# Patient Record
Sex: FEMALE | Race: WHITE | Employment: OTHER | URBAN - METROPOLITAN AREA
[De-identification: names, ages, dates, MRNs, and addresses within clinical notes are randomized per-mention and may not be internally consistent; named-entity substitution may affect disease eponyms.]

---

## 2017-12-13 ENCOUNTER — HOSPITAL ENCOUNTER (OUTPATIENT)
Dept: WOMENS IMAGING | Age: 62
Discharge: HOME | End: 2017-12-13
Attending: INTERNAL MEDICINE | Admitting: INTERNAL MEDICINE

## 2017-12-13 DIAGNOSIS — Z12.31 VISIT FOR SCREENING MAMMOGRAM: ICD-10-CM

## 2018-03-14 PROBLEM — F34.1 DYSTHYMIC DISORDER: Status: ACTIVE | Noted: 2018-03-14

## 2018-03-14 PROBLEM — E78.00 HIGH CHOLESTEROL: Status: ACTIVE | Noted: 2018-03-14

## 2018-03-14 PROBLEM — Z12.11 SCREEN FOR COLON CANCER: Status: ACTIVE | Noted: 2018-03-14

## 2018-03-14 PROBLEM — R56.9 SEIZURE (HCC): Status: ACTIVE | Noted: 2018-03-14

## 2018-04-13 PROBLEM — Z12.11 SCREEN FOR COLON CANCER: Status: RESOLVED | Noted: 2018-03-14 | Resolved: 2018-04-13

## 2018-06-14 PROBLEM — G47.00 INSOMNIA: Status: ACTIVE | Noted: 2018-06-14

## 2018-06-14 PROBLEM — I10 HYPERTENSION: Status: ACTIVE | Noted: 2018-06-14

## 2018-07-31 PROBLEM — S72.001A CLOSED RIGHT HIP FRACTURE, INITIAL ENCOUNTER (HCC): Status: ACTIVE | Noted: 2018-07-31

## 2018-08-02 PROBLEM — S72.001S CLOSED RIGHT HIP FRACTURE, SEQUELA: Status: ACTIVE | Noted: 2018-08-02

## 2018-08-08 ENCOUNTER — CARE COORDINATION (OUTPATIENT)
Dept: CASE MANAGEMENT | Age: 63
End: 2018-08-08

## 2018-08-10 ENCOUNTER — CARE COORDINATION (OUTPATIENT)
Dept: CASE MANAGEMENT | Age: 63
End: 2018-08-10

## 2018-08-13 ENCOUNTER — CARE COORDINATION (OUTPATIENT)
Dept: CASE MANAGEMENT | Age: 63
End: 2018-08-13

## 2018-08-17 ENCOUNTER — CARE COORDINATION (OUTPATIENT)
Dept: CASE MANAGEMENT | Age: 63
End: 2018-08-17

## 2018-08-17 DIAGNOSIS — S72.001S CLOSED RIGHT HIP FRACTURE, SEQUELA: Primary | ICD-10-CM

## 2018-08-17 PROCEDURE — 1111F DSCHRG MED/CURRENT MED MERGE: CPT

## 2018-08-17 NOTE — CARE COORDINATION
Spoke with Philip Gomez - sister & guardian of patient. Brian Emmanuel (patient)    Incision status: dry & intact - steri strips in place    Edema/Swelling - no edema noted at surgical site    Pain level and status: 5/10    Use of pain medications: yes - [facility] feels the pain meds are effective for her sister. Bowels: moving without difficulty    Home Care Agency active: Patient has 24 hr caregivers at home. HHC from [facility] will be having start of care today. Outpatient therapy: N/A    Do you have all of your medications: yes    Changes in medications: yes    [facility] states they received a copy of Seble's d/c instructions. [facility] does not remember if they were reviewed with them - she does not think so. [facility] reviewed her sister's daily med list with writer ([id number] completed). [facility] will put the GARRY hose on Seble. Discussed need for follow up appts. [facility] states the caregiver who transports Maritza Hernandez will need to schedule the follow up appts. Discussed [facility]JR program. Confirmed mailing address - letter sent.     Follow up appointments:    Future Appointments  Date Time Provider Bc Wesley   9/12/2018 11:00 AM Shweta Coventry, MD [facility]

## 2018-08-27 ENCOUNTER — CARE COORDINATION (OUTPATIENT)
Dept: CASE MANAGEMENT | Age: 63
End: 2018-08-27

## 2018-08-27 NOTE — CARE COORDINATION
Attempted to reach pt for CCJR ortho bundle follow up call. Left message requesting call back.     Holland Duque [facility]   [phone]

## 2018-09-17 ENCOUNTER — CARE COORDINATION (OUTPATIENT)
Dept: CASE MANAGEMENT | Age: 63
End: 2018-09-17

## 2018-10-30 ENCOUNTER — CARE COORDINATION (OUTPATIENT)
Dept: CASE MANAGEMENT | Age: 63
End: 2018-10-30

## 2018-12-12 ENCOUNTER — HOSPITAL ENCOUNTER (OUTPATIENT)
Age: 63
Discharge: HOME | End: 2018-12-12
Payer: MEDICARE

## 2018-12-12 ENCOUNTER — HOSPITAL ENCOUNTER (OUTPATIENT)
Dept: GENERAL RADIOLOGY | Age: 63
Discharge: HOME | End: 2018-12-12
Payer: MEDICARE

## 2018-12-12 DIAGNOSIS — M25.551 PAIN OF RIGHT HIP JOINT: ICD-10-CM

## 2018-12-12 PROCEDURE — 73502 X-RAY EXAM HIP UNI 2-3 VIEWS: CPT

## 2019-01-14 ENCOUNTER — HOSPITAL ENCOUNTER (OUTPATIENT)
Dept: WOMENS IMAGING | Age: 64
Discharge: HOME | End: 2019-01-14
Payer: MEDICARE

## 2019-01-14 DIAGNOSIS — Z12.31 VISIT FOR SCREENING MAMMOGRAM: ICD-10-CM

## 2019-01-14 PROCEDURE — 77067 SCR MAMMO BI INCL CAD: CPT

## 2019-07-11 ENCOUNTER — OFFICE VISIT (OUTPATIENT)
Dept: ORTHOPEDIC SURGERY | Age: 64
End: 2019-07-11
Payer: MEDICARE

## 2019-07-11 VITALS
BODY MASS INDEX: 22.66 KG/M2 | SYSTOLIC BLOOD PRESSURE: 137 MMHG | HEART RATE: 72 BPM | WEIGHT: 120 LBS | DIASTOLIC BLOOD PRESSURE: 82 MMHG | HEIGHT: 61 IN

## 2019-07-11 DIAGNOSIS — Z96.641 STATUS POST TOTAL REPLACEMENT OF RIGHT HIP: Primary | ICD-10-CM

## 2019-07-11 DIAGNOSIS — M25.451 SWELLING OF JOINT OF PELVIC REGION OR THIGH, RIGHT: ICD-10-CM

## 2019-07-11 PROCEDURE — G8427 DOCREV CUR MEDS BY ELIG CLIN: HCPCS | Performed by: ORTHOPAEDIC SURGERY

## 2019-07-11 PROCEDURE — G8420 CALC BMI NORM PARAMETERS: HCPCS | Performed by: ORTHOPAEDIC SURGERY

## 2019-07-11 PROCEDURE — 3017F COLORECTAL CA SCREEN DOC REV: CPT | Performed by: ORTHOPAEDIC SURGERY

## 2019-07-11 PROCEDURE — 99213 OFFICE O/P EST LOW 20 MIN: CPT | Performed by: ORTHOPAEDIC SURGERY

## 2019-07-11 PROCEDURE — 1036F TOBACCO NON-USER: CPT | Performed by: ORTHOPAEDIC SURGERY

## 2019-07-11 NOTE — PROGRESS NOTES
replacement of right hip  XR HIP 2-3 VW W PELVIS RIGHT   2. Swelling of joint of pelvic region or thigh, right  CT FEMUR RIGHT WO CONTRAST       No orders of the defined types were placed in this encounter.     Differential:  Coxa saltans, scar tissue, iliopsoas irritation/rubbing, instability  Felt to be less likely instability  Reassurance given    No obvious clinical signs of infection    Will obtain CT of right thigh to evaluate her asymmetric thigh swelling, and will call her with results    Zoila Lopez

## 2019-07-18 ENCOUNTER — HOSPITAL ENCOUNTER (EMERGENCY)
Age: 64
Discharge: HOME | End: 2019-07-18
Attending: EMERGENCY MEDICINE
Payer: MEDICARE

## 2019-07-18 VITALS
TEMPERATURE: 97.8 F | BODY MASS INDEX: 22.98 KG/M2 | RESPIRATION RATE: 19 BRPM | OXYGEN SATURATION: 98 % | HEIGHT: 61 IN | SYSTOLIC BLOOD PRESSURE: 144 MMHG | DIASTOLIC BLOOD PRESSURE: 76 MMHG | HEART RATE: 65 BPM | WEIGHT: 121.69 LBS

## 2019-07-18 DIAGNOSIS — T50.905A MEDICATION REACTION, INITIAL ENCOUNTER: ICD-10-CM

## 2019-07-18 DIAGNOSIS — R42 DIZZINESS: Primary | ICD-10-CM

## 2019-07-18 PROCEDURE — 93005 ELECTROCARDIOGRAM TRACING: CPT | Performed by: EMERGENCY MEDICINE

## 2019-07-18 PROCEDURE — 99284 EMERGENCY DEPT VISIT MOD MDM: CPT

## 2019-07-18 NOTE — ED PROVIDER NOTES
occasionally she would feel dizzy and almost fall and complain of just losing her balance. Hence caregiver brought her in for evaluation. Patient is asymptomatic. SPECT is probably medication related. We will probably decrease her atenolol back to 50 and may be even increase it to twice a day instead of just once in the morning. I think that might be too much for her at this time but will leave it up to her family doctor. This is just a recommendation at this time. Further testing warranted at this time. REVAL:         CRITICAL CARE TIME   Total CriticalCare time was 0 minutes, excluding separately reportable procedures. There was a high probability of clinically significant/life threatening deterioration in the patient's condition which required my urgent intervention. CONSULTS:  None    PROCEDURES:  Unless otherwise noted below, none     [unfilled]    FINAL IMPRESSION      1. Dizziness    2. Medication reaction, initial encounter          DISPOSITION/PLAN   DISPOSITION Decision To Discharge 07/18/2019 01:40:40 PM      PATIENT REFERRED TO:  Maggi Panchal MD  [facility] [id number]  [phone]    Schedule an appointment as soon as possible for a visit in 1 week  If symptoms worsen      DISCHARGE MEDICATIONS:  New Prescriptions    No medications on file          (Please note:  Portions of this note were completed with a voice recognition program.Efforts were made to edit the dictations but occasionally words and phrases are mis-transcribed.)  Form v2016. [id number]    Aníbal RODGERS MD (electronically signed)  Emergency Medicine Provider        Talha Lopez MD  07/18/19 5415

## 2019-07-18 NOTE — ED TRIAGE NOTES
Pt presents the ED with caregiver who states the patient was tripping over her feet today, seemed dizzy. Has not experienced this before.

## 2019-07-19 LAB
EKG ATRIAL RATE: 65 BPM
EKG DIAGNOSIS: NORMAL
EKG P AXIS: 23 DEGREES
EKG P-R INTERVAL: 128 MS
EKG Q-T INTERVAL: 416 MS
EKG QRS DURATION: 92 MS
EKG QTC CALCULATION (BAZETT): 432 MS
EKG R AXIS: 24 DEGREES
EKG T AXIS: 42 DEGREES
EKG VENTRICULAR RATE: 65 BPM

## 2019-07-19 PROCEDURE — 93010 ELECTROCARDIOGRAM REPORT: CPT | Performed by: INTERNAL MEDICINE

## 2020-01-16 ENCOUNTER — HOSPITAL ENCOUNTER (OUTPATIENT)
Dept: WOMENS IMAGING | Age: 65
Discharge: HOME | End: 2020-01-16
Payer: MEDICARE

## 2020-01-16 PROCEDURE — 77067 SCR MAMMO BI INCL CAD: CPT

## 2021-01-11 DIAGNOSIS — E78.00 HIGH CHOLESTEROL: ICD-10-CM

## 2021-01-12 LAB
A/G RATIO: 1.3 (ref 1.1–2.2)
ALBUMIN SERPL-MCNC: 4 G/DL (ref 3.4–5)
ALP BLD-CCNC: 131 U/L (ref 40–129)
ALT SERPL-CCNC: 26 U/L (ref 10–40)
ANION GAP SERPL CALCULATED.3IONS-SCNC: 13 MMOL/L (ref 3–16)
AST SERPL-CCNC: 41 U/L (ref 15–37)
BASOPHILS ABSOLUTE: 0 K/UL (ref 0–0.2)
BASOPHILS RELATIVE PERCENT: 0.5 %
BILIRUB SERPL-MCNC: 0.3 MG/DL (ref 0–1)
BUN BLDV-MCNC: 18 MG/DL (ref 7–20)
CALCIUM SERPL-MCNC: 9.1 MG/DL (ref 8.3–10.6)
CHLORIDE BLD-SCNC: 104 MMOL/L (ref 99–110)
CHOLESTEROL, TOTAL: 150 MG/DL (ref 0–199)
CO2: 25 MMOL/L (ref 21–32)
CREAT SERPL-MCNC: 0.8 MG/DL (ref 0.6–1.2)
EOSINOPHILS ABSOLUTE: 0.1 K/UL (ref 0–0.6)
EOSINOPHILS RELATIVE PERCENT: 2 %
GFR AFRICAN AMERICAN: >60
GFR NON-AFRICAN AMERICAN: >60
GLOBULIN: 3 G/DL
GLUCOSE BLD-MCNC: 85 MG/DL (ref 70–99)
HCT VFR BLD CALC: 37.8 % (ref 36–48)
HDLC SERPL-MCNC: 52 MG/DL (ref 40–60)
HEMOGLOBIN: 12.8 G/DL (ref 12–16)
LDL CHOLESTEROL CALCULATED: 78 MG/DL
LYMPHOCYTES ABSOLUTE: 0.9 K/UL (ref 1–5.1)
LYMPHOCYTES RELATIVE PERCENT: 30.8 %
MCH RBC QN AUTO: 33.2 PG (ref 26–34)
MCHC RBC AUTO-ENTMCNC: 34 G/DL (ref 31–36)
MCV RBC AUTO: 97.8 FL (ref 80–100)
MONOCYTES ABSOLUTE: 0.4 K/UL (ref 0–1.3)
MONOCYTES RELATIVE PERCENT: 14.3 %
NEUTROPHILS ABSOLUTE: 1.6 K/UL (ref 1.7–7.7)
NEUTROPHILS RELATIVE PERCENT: 52.4 %
PDW BLD-RTO: 13.1 % (ref 12.4–15.4)
PLATELET # BLD: 100 K/UL (ref 135–450)
PMV BLD AUTO: 8.1 FL (ref 5–10.5)
POTASSIUM SERPL-SCNC: 4.1 MMOL/L (ref 3.5–5.1)
RBC # BLD: 3.87 M/UL (ref 4–5.2)
SODIUM BLD-SCNC: 142 MMOL/L (ref 136–145)
TOTAL PROTEIN: 7 G/DL (ref 6.4–8.2)
TRIGL SERPL-MCNC: 102 MG/DL (ref 0–150)
TSH SERPL DL<=0.05 MIU/L-ACNC: 1.25 UIU/ML (ref 0.27–4.2)
VLDLC SERPL CALC-MCNC: 20 MG/DL
WBC # BLD: 3 K/UL (ref 4–11)

## 2021-07-28 PROBLEM — D68.9 COAGULATION DEFECT (HCC): Status: ACTIVE | Noted: 2021-07-28

## 2022-05-18 ENCOUNTER — HOSPITAL ENCOUNTER (OUTPATIENT)
Dept: WOMENS IMAGING | Age: 67
Discharge: HOME | End: 2022-05-18
Payer: MEDICARE

## 2022-05-18 DIAGNOSIS — Z12.11 SCREEN FOR COLON CANCER: ICD-10-CM

## 2022-05-18 DIAGNOSIS — E78.00 HIGH CHOLESTEROL: ICD-10-CM

## 2022-05-18 DIAGNOSIS — Z12.31 OTHER SCREENING MAMMOGRAM: ICD-10-CM

## 2022-05-18 PROCEDURE — 77067 SCR MAMMO BI INCL CAD: CPT

## 2022-05-23 RX ORDER — DESIPRAMINE HYDROCHLORIDE 10 MG/1
10 TABLET ORAL NIGHTLY
COMMUNITY

## 2022-05-24 ENCOUNTER — ANESTHESIA EVENT (OUTPATIENT)
Dept: ENDOSCOPY | Age: 67
End: 2022-05-24
Payer: MEDICARE

## 2022-05-25 ENCOUNTER — HOSPITAL ENCOUNTER (OUTPATIENT)
Age: 67
Setting detail: OUTPATIENT SURGERY
Discharge: HOME | End: 2022-05-25
Attending: INTERNAL MEDICINE | Admitting: INTERNAL MEDICINE
Payer: MEDICARE

## 2022-05-25 ENCOUNTER — ANESTHESIA (OUTPATIENT)
Dept: ENDOSCOPY | Age: 67
End: 2022-05-25
Payer: MEDICARE

## 2022-05-25 VITALS
WEIGHT: 123 LBS | SYSTOLIC BLOOD PRESSURE: 142 MMHG | DIASTOLIC BLOOD PRESSURE: 92 MMHG | TEMPERATURE: 96.8 F | HEIGHT: 61 IN | HEART RATE: 79 BPM | OXYGEN SATURATION: 100 % | RESPIRATION RATE: 18 BRPM | BODY MASS INDEX: 23.22 KG/M2

## 2022-05-25 DIAGNOSIS — Z12.11 SCREEN FOR COLON CANCER: ICD-10-CM

## 2022-05-25 PROCEDURE — 7100000011 HC PHASE II RECOVERY - ADDTL 15 MIN: Performed by: INTERNAL MEDICINE

## 2022-05-25 PROCEDURE — 6360000002 HC RX W HCPCS

## 2022-05-25 PROCEDURE — 2500000003 HC RX 250 WO HCPCS

## 2022-05-25 PROCEDURE — 2709999900 HC NON-CHARGEABLE SUPPLY: Performed by: INTERNAL MEDICINE

## 2022-05-25 PROCEDURE — 3609010300 HC COLONOSCOPY W/BIOPSY SINGLE/MULTIPLE: Performed by: INTERNAL MEDICINE

## 2022-05-25 PROCEDURE — 3700000001 HC ADD 15 MINUTES (ANESTHESIA): Performed by: INTERNAL MEDICINE

## 2022-05-25 PROCEDURE — 3700000000 HC ANESTHESIA ATTENDED CARE: Performed by: INTERNAL MEDICINE

## 2022-05-25 PROCEDURE — 88305 TISSUE EXAM BY PATHOLOGIST: CPT

## 2022-05-25 PROCEDURE — 2580000003 HC RX 258: Performed by: ANESTHESIOLOGY

## 2022-05-25 PROCEDURE — 7100000010 HC PHASE II RECOVERY - FIRST 15 MIN: Performed by: INTERNAL MEDICINE

## 2022-05-25 RX ORDER — SODIUM CHLORIDE 0.9 % (FLUSH) 0.9 %
5-40 SYRINGE (ML) INJECTION PRN
Status: DISCONTINUED | OUTPATIENT
Start: 2022-05-25 | End: 2022-05-25 | Stop reason: HOSPADM

## 2022-05-25 RX ORDER — SODIUM CHLORIDE 0.9 % (FLUSH) 0.9 %
5-40 SYRINGE (ML) INJECTION EVERY 12 HOURS SCHEDULED
Status: DISCONTINUED | OUTPATIENT
Start: 2022-05-25 | End: 2022-05-25 | Stop reason: HOSPADM

## 2022-05-25 RX ORDER — SODIUM CHLORIDE 9 MG/ML
INJECTION, SOLUTION INTRAVENOUS PRN
Status: DISCONTINUED | OUTPATIENT
Start: 2022-05-25 | End: 2022-05-25 | Stop reason: HOSPADM

## 2022-05-25 RX ORDER — LIDOCAINE HYDROCHLORIDE 20 MG/ML
INJECTION, SOLUTION EPIDURAL; INFILTRATION; INTRACAUDAL; PERINEURAL PRN
Status: DISCONTINUED | OUTPATIENT
Start: 2022-05-25 | End: 2022-05-25 | Stop reason: SDUPTHER

## 2022-05-25 RX ORDER — PROPOFOL 10 MG/ML
INJECTION, EMULSION INTRAVENOUS PRN
Status: DISCONTINUED | OUTPATIENT
Start: 2022-05-25 | End: 2022-05-25 | Stop reason: SDUPTHER

## 2022-05-25 RX ADMIN — SODIUM CHLORIDE 5 ML/HR: 9 INJECTION, SOLUTION INTRAVENOUS at 10:35

## 2022-05-25 RX ADMIN — PROPOFOL 30 MG: 10 INJECTION, EMULSION INTRAVENOUS at 10:51

## 2022-05-25 RX ADMIN — LIDOCAINE HYDROCHLORIDE 100 MG: 20 INJECTION, SOLUTION EPIDURAL; INFILTRATION; INTRACAUDAL; PERINEURAL at 10:42

## 2022-05-25 RX ADMIN — PROPOFOL 70 MG: 10 INJECTION, EMULSION INTRAVENOUS at 10:42

## 2022-05-25 RX ADMIN — PROPOFOL 30 MG: 10 INJECTION, EMULSION INTRAVENOUS at 10:47

## 2022-05-25 ASSESSMENT — LIFESTYLE VARIABLES: SMOKING_STATUS: 0

## 2022-05-25 ASSESSMENT — PAIN - FUNCTIONAL ASSESSMENT: PAIN_FUNCTIONAL_ASSESSMENT: NONE - DENIES PAIN

## 2022-05-25 NOTE — ANESTHESIA POSTPROCEDURE EVALUATION
Department of Anesthesiology  Postprocedure Note    Patient: Lachelle Mcgowan  MRN: [id number]  YOB: 1955  Date of evaluation: 5/25/2022  Time:  11:54 AM     Procedure Summary     Date: 05/25/22 Room / Location: [address] [place] [address]    Anesthesia Start: 5450 Anesthesia Stop: 0854    Procedure: COLONOSCOPY WITH BIOPSY (N/A ) Diagnosis:       Screen for colon cancer      (Screen, incomplete colon in 2014)    Surgeons: Josiah Runao MD Responsible Provider: Valeria Sanchez MD    Anesthesia Type: MAC ASA Status: 2          Anesthesia Type: MAC    Carina Phase I: Carina Score: 10    Carina Phase II: Carina Score: 10    Last vitals: Reviewed and per EMR flowsheets. Anesthesia Post Evaluation    Patient location during evaluation: PACU  Patient participation: complete - patient participated  Level of consciousness: awake and alert  Airway patency: patent  Nausea & Vomiting: no nausea and no vomiting  Complications: no  Cardiovascular status: hemodynamically stable and blood pressure returned to baseline  Respiratory status: spontaneous ventilation, nonlabored ventilation and room air  Hydration status: stable  Comments: Ms. Hilary Gaming was seen resting comfortably following procedure. Appropriate for discharge home with .

## 2022-05-25 NOTE — ANESTHESIA PRE PROCEDURE
Department of Anesthesiology  Preprocedure Note       Name:  Oliverio Rivera   Age:  61 y.o.  :  1955                                          MRN:  [id number]         Date:  2022      Surgeon: Aruna Avery):  Shubham Stevens MD    Procedure: Procedure(s):  COLONOSCOPY DIAGNOSTIC    Medications prior to admission:   Prior to Admission medications    Medication Sig Start Date End Date Taking?  Authorizing Provider   desipramine (NOPRAMIN) 10 MG tablet Take 10 mg by mouth nightly   Yes Historical Provider, MD   amLODIPine (NORVASC) 5 MG tablet TAKE ONE TABLET BY MOUTH DAILY 3/21/22   Ross Cunha MD   ARIPiprazole (ABILIFY) 5 MG tablet Take 1 tablet by mouth daily 3/11/22   Ross Cunha MD   carBAMazepine (TEGRETOL) 200 MG tablet TAKE ONE TABLET BY MOUTH TWICE A DAY 22   Ross Cunha MD   atenolol (TENORMIN) 50 MG tablet TAKE ONE TABLET BY MOUTH TWICE A DAY  Patient taking differently: TAKE ONE TABLET  A DAY 22   Ross Cunha MD       Current medications:    Current Facility-Administered Medications   Medication Dose Route Frequency Provider Last Rate Last Admin    sodium chloride flush 0.9 % injection 5-40 mL  5-40 mL IntraVENous 2 times per day Gopi Krueger MD        sodium chloride flush 0.9 % injection 5-40 mL  5-40 mL IntraVENous PRN Gopi Krueger MD        0.9 % sodium chloride infusion   IntraVENous PRN Gopi Krueger MD 5 mL/hr at 22 1035 5 mL/hr at 22 1035       Allergies:  No Known Allergies    Problem List:    Patient Active Problem List   Diagnosis Code    High cholesterol E78.00    Seizure ([facility] R56.9    Dysthymic disorder F34.1    Hypertension I10    Insomnia G47.00    Closed displaced fracture of right femoral neck ([address] S72.001A    MR (mental retardation) F79    Hypokalemia E87.6    Closed right hip fracture, sequela S72.001S    Coagulation defect ([facility] [address] D68.9       Past Medical History:        Diagnosis Date    Anxiety     Depression     Hypertension     Mental deficiency     Mild mental handicap     Seizures ([address]     last one years ago       Past Surgical History:        Procedure Laterality Date    HYSTERECTOMY         Social History:    Social History     Tobacco Use    Smoking status: Never Smoker    Smokeless tobacco: Never Used   Substance Use Topics    Alcohol use: No                                Counseling given: Not Answered      Vital Signs (Current):   Vitals:    05/23/22 1149 05/25/22 1024 05/25/22 1029   BP:   (!) 144/72   Pulse:   75   Resp:   17   Temp:   97.7 °F (36.5 °C)   TempSrc:   Temporal   SpO2:   100%   Weight: 123 lb (55.8 kg) 123 lb (55.8 kg)    Height: 5' 1\" (1.549 m) 5' 1\" (1.549 m)                                               BP Readings from Last 3 Encounters:   05/25/22 (!) 144/72   01/11/22 125/78   09/17/20 130/78       NPO Status: Time of last liquid consumption: 0800                        Time of last solid consumption: 2300                        Date of last liquid consumption: 05/25/22                        Date of last solid food consumption: 05/23/22    BMI:   Wt Readings from Last 3 Encounters:   05/25/22 123 lb (55.8 kg)   03/10/22 123 lb (55.8 kg)   01/11/22 121 lb (54.9 kg)     Body mass index is 23.24 kg/m².     CBC:   Lab Results   Component Value Date    WBC 4.3 05/18/2022    RBC 3.64 05/18/2022    HGB 12.3 05/18/2022    HCT 36.0 05/18/2022    MCV 98.8 05/18/2022    RDW 13.5 05/18/2022     05/18/2022       CMP:   Lab Results   Component Value Date     05/18/2022    K 3.7 05/18/2022     05/18/2022    CO2 26 05/18/2022    BUN 16 05/18/2022    CREATININE 1.0 05/18/2022    GFRAA >60 05/18/2022    AGRATIO 1.2 05/18/2022    LABGLOM 56 05/18/2022    GLUCOSE 75 05/18/2022    PROT 7.6 05/18/2022    CALCIUM 9.2 05/18/2022    BILITOT 0.3 05/18/2022    ALKPHOS 195 05/18/2022    AST 39 05/18/2022    ALT 27 05/18/2022       POC Tests: No results for input(s): POCGLU, POCNA, POCK, POCCL, Negar Dias, POCHCT in the last 72 hours. Coags:   Lab Results   Component Value Date    PROTIME 12.5 07/31/2018    INR 1.10 07/31/2018    APTT 31.4 07/31/2018       HCG (If Applicable): No results found for: PREGTESTUR, PREGSERUM, HCG, HCGQUANT     ABGs: No results found for: PHART, PO2ART, FHL8GDF, ELJ6ZDF, BEART, K8ZEBOXX     Type & Screen (If Applicable):  No results found for: LABABO, LABRH    Drug/Infectious Status (If Applicable):  No results found for: HIV, HEPCAB    COVID-19 Screening (If Applicable): No results found for: COVID19        Anesthesia Evaluation   no history of anesthetic complications:   Airway: Mallampati: II  TM distance: >3 FB     Mouth opening: > = 3 FB   Dental:    (+) edentulous      Pulmonary:       (-) COPD, asthma, rhonchi, wheezes, rales and not a current smoker                           Cardiovascular:  Exercise tolerance: no interval change,   (+) hypertension:, hyperlipidemia    (-) orthopnea,  SANCHEZ, murmur, weak pulses and peripheral edema        Rate: normal           Beta Blocker:  Dose within 24 Hrs (Atenolol)      ROS comment: EKG:  Normal sinus rhythm   Normal ECG  When compared with ECG of 31-JUL-2018 18:45, no significant change was found     Neuro/Psych:   (+) seizures (remote history):, depression/anxiety    (-) TIA and CVA            ROS comment: Intellectual disability, MRDD, unable to read  Chronic neck pain, h/o neck fracture GI/Hepatic/Renal:   (+) bowel prep,      (-) liver disease, no renal disease and no morbid obesityGERD: denies. Endo/Other:    (+) blood dyscrasia (coagulation defect)::., .    (-) diabetes mellitus, hypothyroidism, hyperthyroidism               Abdominal:         (-) obese Abdomen: soft. Vascular: Other Findings: Depends in place. Patient's sister at bedside. Anesthesia Plan      MAC     ASA 2     (NPO appropriate per patient & sister [facility] at bedside. Denies nausea with prep.  Requests to keep Depends in place until asleep.)  Induction: intravenous. Anesthetic plan and risks discussed with sibling (Formal constent signed by patient's sister, Elia Christian. Patient verbalized basic understanding of procedure, assents to procedure). Plan discussed with CRNA. This pre-anesthesia assessment may be used as a history and physical.    DOS STAFF ADDENDUM:    Pt seen and examined, chart reviewed (including anesthesia, drug and allergy history). No interval changes to history and physical examination. Anesthetic plan, risks, benefits, alternatives, and personnel involved discussed with patient. Patient verbalized an understanding and agrees to proceed.       Rosalia Spivey MD  May 25, 2022  10:39 AM

## 2022-05-25 NOTE — H&P
[facility]   Pre-operative History and Physical    Patient: aV Montelongo  : 1955  Acct#: [de-identified]    History Obtained From: electronic medical record    HISTORY OF PRESENT ILLNESS  Procedure:Colonoscopy  Indications:screening  Past Medical History:        Diagnosis Date    Anxiety     Depression     Hypertension     Mental deficiency     Mild mental handicap     Seizures ([address]     last one years ago     Past Surgical History:        Procedure Laterality Date    HYSTERECTOMY       Medications prior to admission:   Prior to Admission medications    Medication Sig Start Date End Date Taking? Authorizing Provider   desipramine (NOPRAMIN) 10 MG tablet Take 10 mg by mouth nightly   Yes Historical Provider, MD   amLODIPine (NORVASC) 5 MG tablet TAKE ONE TABLET BY MOUTH DAILY 3/21/22   Michelle Robles MD   ARIPiprazole (ABILIFY) 5 MG tablet Take 1 tablet by mouth daily 3/11/22   Michelle Robles MD   carBAMazepine (TEGRETOL) 200 MG tablet TAKE ONE TABLET BY MOUTH TWICE A DAY 22   Michelle Robles MD   atenolol (TENORMIN) 50 MG tablet TAKE ONE TABLET BY MOUTH TWICE A DAY  Patient taking differently: TAKE ONE TABLET  A DAY 22   Michelle Robles MD     Allergies:   Patient has no known allergies.     Social History     Socioeconomic History    Marital status: Single     Spouse name: Not on file    Number of children: Not on file    Years of education: Not on file    Highest education level: Not on file   Occupational History    Not on file   Tobacco Use    Smoking status: Never Smoker    Smokeless tobacco: Never Used   Vaping Use    Vaping Use: Never used   Substance and Sexual Activity    Alcohol use: No    Drug use: No    Sexual activity: Not Currently   Other Topics Concern    Not on file   Social History Narrative    Not on file     Social Determinants of Health     Financial Resource Strain:     Difficulty of Paying Living Expenses: Not on file   Food Insecurity:  Worried About Running Out of Food in the Last Year: Not on file    Mukul of Food in the Last Year: Not on file   Transportation Needs:     Lack of Transportation (Medical): Not on file    Lack of Transportation (Non-Medical): Not on file   Physical Activity:     Days of Exercise per Week: Not on file    Minutes of Exercise per Session: Not on file   Stress:     Feeling of Stress : Not on file   Social Connections:     Frequency of Communication with Friends and Family: Not on file    Frequency of Social Gatherings with Friends and Family: Not on file    Attends [employer] Services: Not on file    Active Member of [address] [place] [address] [employer] or Organizations: Not on file    Attends Club or Organization Meetings: Not on file    Marital Status: Not on file   Intimate Partner Violence:     Fear of Current or Ex-Partner: Not on file    Emotionally Abused: Not on file    Physically Abused: Not on file    Sexually Abused: Not on file   Housing Stability:     Unable to Pay for Housing in the Last Year: Not on file    Number of [place] in the Last Year: Not on file    Unstable Housing in the Last Year: Not on file     History reviewed. No pertinent family history. PHYSICAL EXAM:      Ht 5' 1\" (1.549 m)   Wt 123 lb (55.8 kg)   BMI 23.24 kg/m²  I        Heart:normal    Lungs: normal    Abdomen: normal      ASA Grade:  See anesthesia note      ASSESSMENT AND PLAN:    1. Procedure options, risks and benefits reviewed with patient and expresses understanding.

## 2022-05-25 NOTE — PROCEDURES
[facility]  Endoscopy Note    Patient: Erika Moctezuma  : 1955  Acct#: [de-identified]    Procedure: Colonoscopy with biopsy    Date:  2022    Surgeon:  Fanny He MD, MD    Referring Physician:  Claudene Perfect    Previous Colonoscopy: Yes  Date: 14  Greater than 3 years? Yes    Preoperative Diagnosis:  surveillance    Postoperative Diagnosis:  Colon polyp    Anesthesia:  See anesthesia note    Indications: This is a 61y.o. year old female who presents today with surveillance. Procedure: An informed consent was obtained from the patient after explanation of indications, benefits, possible risks and complications of the procedure. The patient was then taken to the endoscopy suite, placed in the left lateral decubitus position, and the above IV anesthesia was administered. A digital rectal examination was performed and revealed negative without mass, lesions or tenderness. The Olympus CFQ-180-AL video colonoscope was placed in the patient's rectum under digital direction and advanced to the cecum. The cecum was identified by characteristic anatomy and ballottment. The ileocecal valve was identified. The preparation was excellent. The scope was then withdrawn back through the cecum, ascending, transverse, descending and sigmoid colons. Carefull circumferential examination of the mucosa in these areas demonstrated a 2 mm polyp in the cecum that was biopsied and removed. The scope was then withdrawn into the rectum and retroflexed. The retroflexed view of the anal verge and rectum demonstrates no abnormalities. The scope was straightened, the colon was decompressed and the scope was withdrawn from the patient. The patient tolerated the procedure well and was taken to the PACU in good condition. Estimated Blood Loss:  none    Impression:  Colon polyp    Recommendations:  Await pathology. Repeat colonoscopy in 5 years.     Fanny He MD, MD   [facility]  2022

## 2022-06-27 PROBLEM — N18.30 CHRONIC RENAL DISEASE, STAGE III (HCC): Status: ACTIVE | Noted: 2022-06-27

## 2023-02-21 DIAGNOSIS — E78.00 HIGH CHOLESTEROL: ICD-10-CM

## 2023-02-21 LAB
A/G RATIO: 1.1 (ref 1.1–2.2)
ALBUMIN SERPL-MCNC: 3.7 G/DL (ref 3.4–5)
ALP BLD-CCNC: 208 U/L (ref 40–129)
ALT SERPL-CCNC: 25 U/L (ref 10–40)
ANION GAP SERPL CALCULATED.3IONS-SCNC: 10 MMOL/L (ref 3–16)
AST SERPL-CCNC: 36 U/L (ref 15–37)
BILIRUB SERPL-MCNC: 0.4 MG/DL (ref 0–1)
BUN BLDV-MCNC: 18 MG/DL (ref 7–20)
CALCIUM SERPL-MCNC: 8.7 MG/DL (ref 8.3–10.6)
CHLORIDE BLD-SCNC: 103 MMOL/L (ref 99–110)
CHOLESTEROL, TOTAL: 156 MG/DL (ref 0–199)
CO2: 28 MMOL/L (ref 21–32)
CREAT SERPL-MCNC: 0.9 MG/DL (ref 0.6–1.2)
GFR SERPL CREATININE-BSD FRML MDRD: >60 ML/MIN/{1.73_M2}
GLUCOSE BLD-MCNC: 90 MG/DL (ref 70–99)
HDLC SERPL-MCNC: 52 MG/DL (ref 40–60)
LDL CHOLESTEROL CALCULATED: 78 MG/DL
POTASSIUM SERPL-SCNC: 3.7 MMOL/L (ref 3.5–5.1)
SODIUM BLD-SCNC: 141 MMOL/L (ref 136–145)
TOTAL PROTEIN: 7 G/DL (ref 6.4–8.2)
TRIGL SERPL-MCNC: 131 MG/DL (ref 0–150)
TSH REFLEX FT4: 2.11 UIU/ML (ref 0.27–4.2)
VLDLC SERPL CALC-MCNC: 26 MG/DL

## 2023-02-22 LAB
ANISOCYTOSIS: ABNORMAL
BASOPHILS ABSOLUTE: 0 K/UL (ref 0–0.2)
BASOPHILS RELATIVE PERCENT: 1.5 %
EOSINOPHILS ABSOLUTE: 0 K/UL (ref 0–0.6)
EOSINOPHILS RELATIVE PERCENT: 1.5 %
FOLATE: 14.42 NG/ML (ref 4.78–24.2)
HCT VFR BLD CALC: 37.9 % (ref 36–48)
HEMOGLOBIN: 12.5 G/DL (ref 12–16)
LYMPHOCYTES ABSOLUTE: 1.1 K/UL (ref 1–5.1)
LYMPHOCYTES RELATIVE PERCENT: 40.3 %
MACROCYTES: ABNORMAL
MCH RBC QN AUTO: 33.1 PG (ref 26–34)
MCHC RBC AUTO-ENTMCNC: 33 G/DL (ref 31–36)
MCV RBC AUTO: 100.1 FL (ref 80–100)
MONOCYTES ABSOLUTE: 0.4 K/UL (ref 0–1.3)
MONOCYTES RELATIVE PERCENT: 15.6 %
NEUTROPHILS ABSOLUTE: 1.1 K/UL (ref 1.7–7.7)
NEUTROPHILS RELATIVE PERCENT: 41.1 %
PDW BLD-RTO: 13.4 % (ref 12.4–15.4)
PLATELET # BLD: 75 K/UL (ref 135–450)
PLATELET SLIDE REVIEW: ABNORMAL
PMV BLD AUTO: 8.6 FL (ref 5–10.5)
RBC # BLD: 3.78 M/UL (ref 4–5.2)
SLIDE REVIEW: ABNORMAL
VITAMIN B-12: 500 PG/ML (ref 211–911)
WBC # BLD: 2.7 K/UL (ref 4–11)

## 2023-07-19 PROBLEM — J44.9 CHRONIC OBSTRUCTIVE PULMONARY DISEASE, UNSPECIFIED (HCC): Status: ACTIVE | Noted: 2023-07-19

## 2025-03-20 ENCOUNTER — APPOINTMENT (OUTPATIENT)
Dept: INTERVENTIONAL RADIOLOGY/VASCULAR | Age: 70
DRG: 441 | End: 2025-03-20
Attending: INTERNAL MEDICINE
Payer: MEDICARE

## 2025-03-20 ENCOUNTER — HOSPITAL ENCOUNTER (INPATIENT)
Age: 70
LOS: 2 days | Discharge: HOSPICE | DRG: 441 | End: 2025-03-22
Attending: INTERNAL MEDICINE | Admitting: INTERNAL MEDICINE
Payer: MEDICARE

## 2025-03-20 DIAGNOSIS — R16.0 LIVER MASS: Primary | ICD-10-CM

## 2025-03-20 PROBLEM — R79.89 ELEVATED LFTS: Status: ACTIVE | Noted: 2025-03-20

## 2025-03-20 PROBLEM — K55.069 MESENTERIC VEIN THROMBOSIS: Status: ACTIVE | Noted: 2025-03-20

## 2025-03-20 PROBLEM — I81 PORTAL VEIN THROMBOSIS: Status: ACTIVE | Noted: 2025-03-20

## 2025-03-20 LAB
ALBUMIN SERPL-MCNC: 2.7 G/DL (ref 3.4–5)
ALBUMIN/GLOB SERPL: 0.8 {RATIO} (ref 1.1–2.2)
ALP SERPL-CCNC: 499 U/L (ref 40–129)
ALT SERPL-CCNC: 57 U/L (ref 10–40)
ANION GAP SERPL CALCULATED.3IONS-SCNC: 11 MMOL/L (ref 3–16)
ANISOCYTOSIS BLD QL SMEAR: ABNORMAL
ANTI-XA UNFRAC HEPARIN: 0.14 IU/ML (ref 0.3–0.7)
ANTI-XA UNFRAC HEPARIN: <0.1 IU/ML (ref 0.3–0.7)
APPEARANCE FLUID: CLEAR
APTT BLD: 21.8 SEC (ref 22.1–36.4)
AST SERPL-CCNC: 122 U/L (ref 15–37)
BASOPHILS # BLD: 0 K/UL (ref 0–0.2)
BASOPHILS NFR BLD: 0 %
BDY FLUID QUALITY: NORMAL
BILIRUB SERPL-MCNC: 2.3 MG/DL (ref 0–1)
BUN SERPL-MCNC: 23 MG/DL (ref 7–20)
CALCIUM SERPL-MCNC: 7.7 MG/DL (ref 8.3–10.6)
CEA SERPL-MCNC: 8.8 NG/ML (ref 0–5)
CELL COUNT FLUID TYPE: NORMAL
CHLORIDE SERPL-SCNC: 109 MMOL/L (ref 99–110)
CO2 SERPL-SCNC: 20 MMOL/L (ref 21–32)
COLOR FLUID: YELLOW
CREAT SERPL-MCNC: 1.1 MG/DL (ref 0.6–1.2)
DEPRECATED RDW RBC AUTO: 16.1 % (ref 12.4–15.4)
EOSINOPHIL # BLD: 0.2 K/UL (ref 0–0.6)
EOSINOPHIL NFR BLD: 2 %
FERRITIN SERPL IA-MCNC: 224 NG/ML (ref 15–150)
FOLATE SERPL-MCNC: 19.4 NG/ML (ref 4.78–24.2)
GFR SERPLBLD CREATININE-BSD FMLA CKD-EPI: 55 ML/MIN/{1.73_M2}
GLUCOSE SERPL-MCNC: 106 MG/DL (ref 70–99)
HBV SURFACE AB SERPL IA-ACNC: 6.29 MIU/ML
HCT VFR BLD AUTO: 31.2 % (ref 36–48)
HCT VFR BLD AUTO: 34 % (ref 36–48)
HGB BLD-MCNC: 10.1 G/DL (ref 12–16)
HGB BLD-MCNC: 10.8 G/DL (ref 12–16)
INR PPP: 1.36 (ref 0.85–1.15)
IRON SATN MFR SERPL: 44 % (ref 15–50)
IRON SERPL-MCNC: 84 UG/DL (ref 37–145)
LACTATE BLDV-SCNC: 2.2 MMOL/L (ref 0.4–2)
LYMPHOCYTES # BLD: 1.7 K/UL (ref 1–5.1)
LYMPHOCYTES NFR BLD: 16 %
LYMPHOCYTES NFR FLD: 10 %
MACROCYTES BLD QL SMEAR: ABNORMAL
MACROPHAGES # FLD: 5 %
MCH RBC QN AUTO: 31.8 PG (ref 26–34)
MCHC RBC AUTO-ENTMCNC: 31.8 G/DL (ref 31–36)
MCV RBC AUTO: 100.1 FL (ref 80–100)
MESOTHL CELL NFR FLD: 4 %
MONOCYTES # BLD: 0.7 K/UL (ref 0–1.3)
MONOCYTES NFR BLD: 7 %
MONOCYTES NFR FLD: 4 %
NEUTROPHIL, FLUID: 77 %
NEUTROPHILS # BLD: 7.9 K/UL (ref 1.7–7.7)
NEUTROPHILS NFR BLD: 73 %
NEUTS BAND NFR BLD MANUAL: 2 % (ref 0–7)
NRBC BLD-RTO: 1 /100 WBC
NUC CELL # FLD: 198 /CUMM
PLATELET # BLD AUTO: 115 K/UL (ref 135–450)
PLATELET BLD QL SMEAR: ABNORMAL
PMV BLD AUTO: 7.6 FL (ref 5–10.5)
POLYCHROMASIA BLD QL SMEAR: ABNORMAL
POTASSIUM SERPL-SCNC: 4.5 MMOL/L (ref 3.5–5.1)
PROT SERPL-MCNC: 6.3 G/DL (ref 6.4–8.2)
PROTHROMBIN TIME: 17 SEC (ref 11.9–14.9)
RBC # BLD AUTO: 3.4 M/UL (ref 4–5.2)
RBC FLUID: <1000 /CUMM
SLIDE REVIEW: ABNORMAL
SODIUM SERPL-SCNC: 140 MMOL/L (ref 136–145)
SPECIMEN SOURCE: NORMAL
TIBC SERPL-MCNC: 191 UG/DL (ref 260–445)
TOTAL CELLS COUNTED FLD: 100
VIT B12 SERPL-MCNC: 3266 PG/ML (ref 211–911)
WBC # BLD AUTO: 10.5 K/UL (ref 4–11)

## 2025-03-20 PROCEDURE — 88305 TISSUE EXAM BY PATHOLOGIST: CPT

## 2025-03-20 PROCEDURE — 82105 ALPHA-FETOPROTEIN SERUM: CPT

## 2025-03-20 PROCEDURE — 51798 US URINE CAPACITY MEASURE: CPT

## 2025-03-20 PROCEDURE — 87341 HEP B SURFACE AG NEUTRLZJ IA: CPT

## 2025-03-20 PROCEDURE — 85520 HEPARIN ASSAY: CPT

## 2025-03-20 PROCEDURE — C1729 CATH, DRAINAGE: HCPCS

## 2025-03-20 PROCEDURE — 83516 IMMUNOASSAY NONANTIBODY: CPT

## 2025-03-20 PROCEDURE — 83605 ASSAY OF LACTIC ACID: CPT

## 2025-03-20 PROCEDURE — 83540 ASSAY OF IRON: CPT

## 2025-03-20 PROCEDURE — 86015 ACTIN ANTIBODY EACH: CPT

## 2025-03-20 PROCEDURE — 87205 SMEAR GRAM STAIN: CPT

## 2025-03-20 PROCEDURE — 85025 COMPLETE CBC W/AUTO DIFF WBC: CPT

## 2025-03-20 PROCEDURE — 88112 CYTOPATH CELL ENHANCE TECH: CPT

## 2025-03-20 PROCEDURE — APPSS60 APP SPLIT SHARED TIME 46-60 MINUTES: Performed by: NURSE PRACTITIONER

## 2025-03-20 PROCEDURE — 2580000003 HC RX 258: Performed by: INTERNAL MEDICINE

## 2025-03-20 PROCEDURE — 89051 BODY FLUID CELL COUNT: CPT

## 2025-03-20 PROCEDURE — 82746 ASSAY OF FOLIC ACID SERUM: CPT

## 2025-03-20 PROCEDURE — 86301 IMMUNOASSAY TUMOR CA 19-9: CPT

## 2025-03-20 PROCEDURE — 1200000000 HC SEMI PRIVATE

## 2025-03-20 PROCEDURE — 86708 HEPATITIS A ANTIBODY: CPT

## 2025-03-20 PROCEDURE — APPNB30 APP NON BILLABLE TIME 0-30 MINS: Performed by: NURSE PRACTITIONER

## 2025-03-20 PROCEDURE — 87040 BLOOD CULTURE FOR BACTERIA: CPT

## 2025-03-20 PROCEDURE — 82607 VITAMIN B-12: CPT

## 2025-03-20 PROCEDURE — 80074 ACUTE HEPATITIS PANEL: CPT

## 2025-03-20 PROCEDURE — 84157 ASSAY OF PROTEIN OTHER: CPT

## 2025-03-20 PROCEDURE — 86039 ANTINUCLEAR ANTIBODIES (ANA): CPT

## 2025-03-20 PROCEDURE — 86038 ANTINUCLEAR ANTIBODIES: CPT

## 2025-03-20 PROCEDURE — 36415 COLL VENOUS BLD VENIPUNCTURE: CPT

## 2025-03-20 PROCEDURE — 85610 PROTHROMBIN TIME: CPT

## 2025-03-20 PROCEDURE — 86706 HEP B SURFACE ANTIBODY: CPT

## 2025-03-20 PROCEDURE — 85018 HEMOGLOBIN: CPT

## 2025-03-20 PROCEDURE — 49083 ABD PARACENTESIS W/IMAGING: CPT

## 2025-03-20 PROCEDURE — 82103 ALPHA-1-ANTITRYPSIN TOTAL: CPT

## 2025-03-20 PROCEDURE — 82042 OTHER SOURCE ALBUMIN QUAN EA: CPT

## 2025-03-20 PROCEDURE — 85730 THROMBOPLASTIN TIME PARTIAL: CPT

## 2025-03-20 PROCEDURE — 83550 IRON BINDING TEST: CPT

## 2025-03-20 PROCEDURE — 6360000002 HC RX W HCPCS: Performed by: INTERNAL MEDICINE

## 2025-03-20 PROCEDURE — 2500000003 HC RX 250 WO HCPCS: Performed by: INTERNAL MEDICINE

## 2025-03-20 PROCEDURE — 82104 ALPHA-1-ANTITRYPSIN PHENO: CPT

## 2025-03-20 PROCEDURE — 87070 CULTURE OTHR SPECIMN AEROBIC: CPT

## 2025-03-20 PROCEDURE — 82378 CARCINOEMBRYONIC ANTIGEN: CPT

## 2025-03-20 PROCEDURE — 99222 1ST HOSP IP/OBS MODERATE 55: CPT | Performed by: SURGERY

## 2025-03-20 PROCEDURE — 82728 ASSAY OF FERRITIN: CPT

## 2025-03-20 PROCEDURE — 0W9G3ZZ DRAINAGE OF PERITONEAL CAVITY, PERCUTANEOUS APPROACH: ICD-10-PCS | Performed by: INTERNAL MEDICINE

## 2025-03-20 PROCEDURE — 82390 ASSAY OF CERULOPLASMIN: CPT

## 2025-03-20 PROCEDURE — 85014 HEMATOCRIT: CPT

## 2025-03-20 PROCEDURE — 80053 COMPREHEN METABOLIC PANEL: CPT

## 2025-03-20 RX ORDER — SODIUM CHLORIDE 9 MG/ML
INJECTION, SOLUTION INTRAVENOUS PRN
Status: DISCONTINUED | OUTPATIENT
Start: 2025-03-20 | End: 2025-03-22 | Stop reason: HOSPADM

## 2025-03-20 RX ORDER — LIDOCAINE HYDROCHLORIDE 10 MG/ML
10 INJECTION, SOLUTION EPIDURAL; INFILTRATION; INTRACAUDAL; PERINEURAL ONCE
Status: COMPLETED | OUTPATIENT
Start: 2025-03-20 | End: 2025-03-20

## 2025-03-20 RX ORDER — SODIUM CHLORIDE 0.9 % (FLUSH) 0.9 %
5-40 SYRINGE (ML) INJECTION EVERY 12 HOURS SCHEDULED
Status: DISCONTINUED | OUTPATIENT
Start: 2025-03-20 | End: 2025-03-22 | Stop reason: HOSPADM

## 2025-03-20 RX ORDER — ACETAMINOPHEN 650 MG/1
650 SUPPOSITORY RECTAL EVERY 6 HOURS PRN
Status: DISCONTINUED | OUTPATIENT
Start: 2025-03-20 | End: 2025-03-22 | Stop reason: HOSPADM

## 2025-03-20 RX ORDER — ACETAMINOPHEN 325 MG/1
650 TABLET ORAL EVERY 6 HOURS PRN
Status: DISCONTINUED | OUTPATIENT
Start: 2025-03-20 | End: 2025-03-22 | Stop reason: HOSPADM

## 2025-03-20 RX ORDER — SODIUM CHLORIDE 9 MG/ML
INJECTION, SOLUTION INTRAVENOUS CONTINUOUS
Status: DISCONTINUED | OUTPATIENT
Start: 2025-03-20 | End: 2025-03-22 | Stop reason: HOSPADM

## 2025-03-20 RX ORDER — HEPARIN SODIUM 10000 [USP'U]/100ML
5-30 INJECTION, SOLUTION INTRAVENOUS CONTINUOUS
Status: DISCONTINUED | OUTPATIENT
Start: 2025-03-20 | End: 2025-03-21

## 2025-03-20 RX ORDER — POLYETHYLENE GLYCOL 3350 17 G/17G
17 POWDER, FOR SOLUTION ORAL DAILY PRN
Status: DISCONTINUED | OUTPATIENT
Start: 2025-03-20 | End: 2025-03-22 | Stop reason: HOSPADM

## 2025-03-20 RX ORDER — SODIUM CHLORIDE 0.9 % (FLUSH) 0.9 %
5-40 SYRINGE (ML) INJECTION PRN
Status: DISCONTINUED | OUTPATIENT
Start: 2025-03-20 | End: 2025-03-22 | Stop reason: HOSPADM

## 2025-03-20 RX ADMIN — SODIUM CHLORIDE: 0.9 INJECTION, SOLUTION INTRAVENOUS at 22:41

## 2025-03-20 RX ADMIN — SODIUM CHLORIDE: 0.9 INJECTION, SOLUTION INTRAVENOUS at 11:47

## 2025-03-20 RX ADMIN — SODIUM CHLORIDE, PRESERVATIVE FREE 10 ML: 5 INJECTION INTRAVENOUS at 11:49

## 2025-03-20 RX ADMIN — LIDOCAINE HYDROCHLORIDE 10 ML: 10 INJECTION, SOLUTION EPIDURAL; INFILTRATION; INTRACAUDAL; PERINEURAL at 14:58

## 2025-03-20 RX ADMIN — HEPARIN SODIUM 12 UNITS/KG/HR: 10000 INJECTION, SOLUTION INTRAVENOUS at 15:57

## 2025-03-20 NOTE — PROGRESS NOTES
Pt and sister unable to verbalize home medications and do not have a list. Message left with [facility] pharmacy in [facility] requesting call back for medication rx review.

## 2025-03-20 NOTE — PROGRESS NOTES
100ml of fluid removed  Spoke to patients EDSON hughes and advised her the amount of fluid removed and that patient was returning to the floor.

## 2025-03-20 NOTE — CONSULTS
above.  - check AFP, CEA, Ca 19-9  - Dr Zhong will review CT images with radiology    Vaccination status -   - check Hepatitis A total AB and Hepatitis B surface antigen for vaccination purposes    Constipation, diarrhea -initially had constipation and then had diarrhea after taking laxatives.  There was however severe colon wall thickening on CT so would send stool for C. difficile.  Had small-volume bleeding described as hemorrhoidal per sister's report.  -Check C. Difficile    Air in the urinary bladder on CT -she does report she is unable to urinate.  Notified RN.  Per primary team.    The above assessment and plan was developed in collaboration with Dr. Trevin Valenzuela, PAS[facility]  [facility]    [facility] STAFF    I have personally performed a face to face diagnostic evaluation on this patient. I have spent more than 50% of the total clinical encounter in interviewing/examining the patient, reviewing patient chart (including but not limited to notes, labs, imaging and other testing), documentation of findings and subsequent follow up of ordered medication and testing, placing referrals and communication with patient care providers, coordinating future care, as well as documentation in the EHR. I performed a substantive portion of the medical decision making. I agree with the findings and recommended plan of care, as documented by the physician assistant/nurse practitioner.  In summary, my findings and plan are the following: As above, 69-year-old woman with developmental delay and multiple medical problems presenting with abdominal discomfort and bloating found to have new onset cirrhosis, portal vein thrombosis extending into the superior mesenteric vein, ascites, and multiple liver lesions on a CT scan performed at an outside institution.  She was transferred here for further evaluation and treatment.  She actually appears comfortable and denies abdominal pain to me.  On exam she has mild  distention, mild cognitive impairment, but otherwise her abdomen is soft and nontender.  She is not jaundiced she has no peripheral edema and she has no asterixis on neuroexam.  I reviewed her CT results with radiology here at [facility].  Her liver lesions are indeterminant and require further imaging.  An MRI of the abdomen without and with contrast using a liver mass protocol will be ordered.  Regarding her thromboses she will require anticoagulation.  She will also require endoscopic evaluation at some point in the future.  Finally, serological workup for causes of liver disease including hepatitis viruses, metabolic and autoimmune causes will be sent.  We will attempt to sample her ascites fluid found on CT.  Further recommendations based on results of the above will follow.  For now, a clear liquid diet is okay from the GI standpoint.    Demar Zhong MD  [facility]  3/20/2025

## 2025-03-20 NOTE — CONSULTS
[facility] GENERAL AND LAPAROSCOPIC SURGERY                       PATIENT NAME: Seble Guerrero     ADMISSION DATE: 3/20/2025  9:28 AM      TODAY'S DATE: 3/20/2025    Reason for Consult:  Abd pain    Requesting Physician:  Dr. VALERY Burns    HISTORY OF PRESENT ILLNESS:              The patient is a 69 y.o. female who presents with abd pain, mid abd, mild, focal,sharp, more with pressure. Has had transfer in from outside hospital - felt bloating and had bloody BM. CT done with concern of met tumor to liver and portal and mesenteric venous thrombosis.    Has sig comorbidity of mental debility, CKD, COPD.  Feeling ill over the past week.  Pt alert, denies severe abd pain at this time.      Past Medical History:        Diagnosis Date    Anxiety     Depression     Hypertension     Mental deficiency     Mild mental handicap     Seizures (HCC)     last one years ago       Past Surgical History:        Procedure Laterality Date    COLONOSCOPY N/A 5/25/2022    COLONOSCOPY WITH BIOPSY performed by Chapo Cochran MD at [facility]    HYSTERECTOMY (CERVIX STATUS UNKNOWN)         Current Medications:   Current Facility-Administered Medications: 0.9 % sodium chloride infusion, , IntraVENous, Continuous  sodium chloride flush 0.9 % injection 5-40 mL, 5-40 mL, IntraVENous, 2 times per day  sodium chloride flush 0.9 % injection 5-40 mL, 5-40 mL, IntraVENous, PRN  0.9 % sodium chloride infusion, , IntraVENous, PRN  polyethylene glycol (GLYCOLAX) packet 17 g, 17 g, Oral, Daily PRN  acetaminophen (TYLENOL) tablet 650 mg, 650 mg, Oral, Q6H PRN **OR** acetaminophen (TYLENOL) suppository 650 mg, 650 mg, Rectal, Q6H PRN  Prior to Admission medications    Medication Sig Start Date End Date Taking? Authorizing Provider   atenolol (TENORMIN) 50 MG tablet TAKE 1 TABLET BY MOUTH 2 TIMES A DAY 2/7/25   Inna Andrews MD   amLODIPine (NORVASC) 5 MG tablet TAKE 1 TABLET BY MOUTH DAILY 10/15/24   Inna Andrews MD

## 2025-03-20 NOTE — PROGRESS NOTES
4 Eyes Skin Assessment     NAME:  Seble Guerrero  YOB: 1955  MEDICAL RECORD NUMBER:  [id number]    The patient is being assessed for  Admission    I agree that at least one RN has performed a thorough Head to Toe Skin Assessment on the patient. ALL assessment sites listed below have been assessed.      Areas assessed by both nurses:    Head, Face, Ears, Shoulders, Back, Chest, Arms, Elbows, Hands, Sacrum. Buttock, Coccyx, Ischium, and Legs. Feet and Heels      Scattered bruising  Does the Patient have a Wound? Yes wound(s) were present on assessment. LDA wound assessment was Initiated and completed by RN DEBO inner buttock        Prakash Prevention initiated by RN: Yes  Wound Care Orders initiated by RN: Yes    Pressure Injury (Stage 3,4, Unstageable, DTI, NWPT, and Complex wounds) if present, place Wound referral order by RN under : Yes    New Ostomies, if present place, Ostomy referral order under : No     Nurse 1 eSignature: Electronically signed by Cristel Del Real RN on 3/20/25 at 3:37 PM EDT    **SHARE this note so that the co-signing nurse can place an eSignature**    Nurse 2 eSignature: Electronically signed by Jessica Burger RN on 3/20/25 at 4:17 PM EDT

## 2025-03-20 NOTE — ACP (ADVANCE CARE PLANNING)
Advanced Care Planning Note.    Purpose of Encounter: Advanced care planning in light of hospitalization  Parties In Attendance: Patient,    Decisional Capacity: Yes  Subjective: Patient  understand that this conversation is to address long term care goal  Objective: Patient  to the hospital with possible metastatic liver disease  Goals of Care Determination: Patient would pursue CPR and Intubation if required  Code Status: full code  Time spent on Advanced care Plannin minutes  Advanced Care Planning Documents: documented patient's wishes, would like Britney Gold   to make medical decisions if unable to make decisions    Brandy Burns MD  3/20/2025 11:12 AM

## 2025-03-20 NOTE — H&P
HOSPITALISTS HISTORY AND PHYSICAL    3/20/2025 11:09 AM    Patient Information:  SEBLE DUNN is a 69 y.o. female [id number]  PCP:  Inna Andrews MD (Tel: [phone] )    Chief complaint:elevated lfts    History of Present Illness:  Seble Dunn is a 69 y.o. female transferred to the hospital from outside hospital.  Patient was presented to the outside hospital with abdominal bloating nausea and loose stools that were bloody.  Patient was found to have possible metastatic liver disease with elevated LFTs portal and mesenteric vein thrombosis and question of ischemic bowel.  Patient has no prior history of cancer no history of cardiac disease no history of COPD no history of diabetes.  Not on any blood thinners at home.  She denies any smoking or drinking no fevers chills or sick contacts.  ED provider at the outside hospital discussed with me about starting a heparin drip was asked if patient had any signs of bleeding stated there were no signs of bleeding however per his HPI patient was complaining of mucus and bloody stools we will stop heparin drip at this time    REVIEW OF SYSTEMS:   Constitutional: Negative for fever,chills or night sweats  ENT: Negative for rhinorrhea, epistaxis, hoarseness, sore throat.  Respiratory: Negative for shortness of breath,wheezing  Cardiovascular: Negative for chest pain, palpitations a  Genitourinary: Negative for polyuria, dysuria   Hematologic/Lymphatic: Negative for bleeding tendency, easy bruising  Musculoskeletal: Negative for myalgias and arthralgias  Neurologic: Negative for confusion,dysarthria.  Skin: Negative for itching,rash  Psychiatric: Negative for depression,anxiety, agitation.  Endocrine: Negative for polydipsia,polyuria,heat /cold intolerance.    Past Medical History:   has a past medical history of Anxiety, Depression, Hypertension, Mental deficiency, Mild  04:02 PM    RDW 13.6 10/15/2024 04:02 PM     10/15/2024 04:02 PM    MPV 8.6 10/15/2024 04:02 PM     BMP:    Lab Results   Component Value Date/Time     10/15/2024 04:02 PM    K 3.8 10/15/2024 04:02 PM     10/15/2024 04:02 PM    CO2 22 10/15/2024 04:02 PM    BUN 23 10/15/2024 04:02 PM    CREATININE 0.8 10/15/2024 04:02 PM    CALCIUM 8.7 10/15/2024 04:02 PM    GFRAA >60 05/18/2022 12:08 PM    LABGLOM 81 10/15/2024 04:02 PM    LABGLOM >60 02/21/2023 09:12 AM    GLUCOSE 81 10/15/2024 04:02 PM     No orders to display       Recent imaging reviewed    Problem List  Principal Problem:    Mesenteric vein thrombosis  Active Problems:    Elevated LFTs  Resolved Problems:    * No resolved hospital problems. *        Assessment/Plan:   Metastatic liver disease will get heme-onc and GI input repeat LFTs now    Question of ischemic bowel check C. difficile lactic acid GI PCR IV fluids get surgery input    Question of GI bleed will get occult stool iron B12 folate repeat CBC now and get GI input hold heparin drip for now    Mesenteric and portal vein thrombosis get vascular input hold anticoagulation until bleed ruled out      DVT prophylaxis scds  Code status full code        Admit as inpatient I anticipate hospitalization spanning more than two midnights for investigation and treatment of the above medically necessary diagnoses.    Please note that some part of this chart was generated using Dragon dictation software. Although every effort was made to ensure the accuracy of this automated transcription, some errors in transcription may have occurred inadvertently. If you may need any clarification, please do not hesitate to contact me through EPIC.       Brandy Burns MD    3/20/2025 11:09 AM

## 2025-03-20 NOTE — PROGRESS NOTES
Physical/Occupational Therapy  Seble Guerrero  PT/OT reviewed patient's chart. Patient currently MANJINDER at IR. Will re-attempt as the schedule allows.  Thank you.  Leeanne Resendiz PT, DPT, [id number]  [address], CHRISTOPHE OTR/L [id number]

## 2025-03-20 NOTE — CONSULTS
[facility]  Consultation Note    Chief Complaint / Reason for Consultation  Mesenteric vein thrombosis     History of Present Illness  Patient is a 69 y.o. female with past medical history of anxiety, HTN, seizure disorder and mild mental handicap who was transferred from OSH for mesenteric vein thrombosis.  She presented to [facility] ED in [place] with complaints of abdominal distention, bloating and constipation.  She reports about a week ago she was fine and then started having frequent bowel movements.  She reports hemorrhoids so had some blood in stool.  She then reports became constipated and abdomen became distended.  She denies any abdominal pain, nausea or vomiting.  She denies tobacco or alcohol use.  She denies any history of blood clots or clotting disorders.  GI and general surgery has been consulted.  She was started on Heparin drip at OSH.  We have been consulted for concern for mesenteric vein thrombosis.     Review of Systems   + constipation and abdominal distention.   Denies fevers, chills, chest pain, shortness of breath, nausea, vomiting, hematemesis, melena, hematochezia, wt changes, vision problems, blindness, hearing problems, facial droop, slurred speech, extremity weakness, extremity numbness, dysuria.    Past Medical History:   Diagnosis Date    Anxiety     Depression     Hypertension     Mental deficiency     Mild mental handicap     Seizures (HCC)     last one years ago       Past Surgical History:   Procedure Laterality Date    COLONOSCOPY N/A 5/25/2022    COLONOSCOPY WITH BIOPSY performed by Chapo Cochran MD at [facility] ENDOSCOPY    HYSTERECTOMY (CERVIX STATUS UNKNOWN)         No Known Allergies    Social History     Socioeconomic History    Marital status: Single     Spouse name: Not on file    Number of children: Not on file    Years of education: Not on file    Highest education level: Not on file   Occupational History    Not on file

## 2025-03-20 NOTE — PROGRESS NOTES
[facility] here to transport pt to [facility]. Packet and report given

## 2025-03-21 ENCOUNTER — APPOINTMENT (OUTPATIENT)
Dept: MRI IMAGING | Age: 70
DRG: 441 | End: 2025-03-21
Attending: INTERNAL MEDICINE
Payer: MEDICARE

## 2025-03-21 LAB
ALBUMIN FLD-MCNC: 0.3 G/DL
ALBUMIN SERPL-MCNC: 2.5 G/DL (ref 3.4–5)
ALBUMIN/GLOB SERPL: 0.8 {RATIO} (ref 1.1–2.2)
ALP SERPL-CCNC: 466 U/L (ref 40–129)
ALT SERPL-CCNC: 54 U/L (ref 10–40)
ANA SER QL IA: POSITIVE
ANION GAP SERPL CALCULATED.3IONS-SCNC: 9 MMOL/L (ref 3–16)
ANISOCYTOSIS BLD QL SMEAR: ABNORMAL
ANTI-XA UNFRAC HEPARIN: 0.35 IU/ML (ref 0.3–0.7)
ANTI-XA UNFRAC HEPARIN: 0.37 IU/ML (ref 0.3–0.7)
ANTI-XA UNFRAC HEPARIN: 0.42 IU/ML (ref 0.3–0.7)
AST SERPL-CCNC: 110 U/L (ref 15–37)
BACTERIA BLD CULT ORG #2: NORMAL
BACTERIA BLD CULT: NORMAL
BASOPHILS # BLD: 0 K/UL (ref 0–0.2)
BASOPHILS NFR BLD: 0 %
BILIRUB SERPL-MCNC: 1.9 MG/DL (ref 0–1)
BUN SERPL-MCNC: 19 MG/DL (ref 7–20)
C DIFF TOX A+B STL QL IA: NORMAL
CALCIUM SERPL-MCNC: 7.5 MG/DL (ref 8.3–10.6)
CANCER AG19-9 SERPL IA-ACNC: 405 U/ML (ref 0–35)
CERULOPLASMIN SERPL-MCNC: 26 MG/DL (ref 16–45)
CHLORIDE SERPL-SCNC: 111 MMOL/L (ref 99–110)
CO2 SERPL-SCNC: 21 MMOL/L (ref 21–32)
CREAT SERPL-MCNC: 1 MG/DL (ref 0.6–1.2)
DEPRECATED RDW RBC AUTO: 16.4 % (ref 12.4–15.4)
EOSINOPHIL # BLD: 0.1 K/UL (ref 0–0.6)
EOSINOPHIL NFR BLD: 1 %
GFR SERPLBLD CREATININE-BSD FMLA CKD-EPI: 62 ML/MIN/{1.73_M2}
GLUCOSE SERPL-MCNC: 91 MG/DL (ref 70–99)
HAPTOGLOB SERPL-MCNC: 69.2 MG/DL (ref 30–200)
HAV IGM SERPL QL IA: ABNORMAL
HBV CORE IGM SERPL QL IA: ABNORMAL
HBV SURFACE AG SERPL QL IA: REACTIVE
HCT VFR BLD AUTO: 30 % (ref 36–48)
HCT VFR BLD AUTO: 30.8 % (ref 36–48)
HCT VFR BLD AUTO: 34 % (ref 36–48)
HCV AB SERPL QL IA: ABNORMAL
HEMOCCULT STL QL: ABNORMAL
HGB BLD-MCNC: 10.5 G/DL (ref 12–16)
HGB BLD-MCNC: 9.4 G/DL (ref 12–16)
HGB BLD-MCNC: 9.8 G/DL (ref 12–16)
HYPERCHROMIA BLD QL SMEAR: ABNORMAL
LDH SERPL L TO P-CCNC: 469 U/L (ref 100–190)
LYMPHOCYTES # BLD: 1.3 K/UL (ref 1–5.1)
LYMPHOCYTES NFR BLD: 14 %
MACROCYTES BLD QL SMEAR: ABNORMAL
MCH RBC QN AUTO: 31.6 PG (ref 26–34)
MCHC RBC AUTO-ENTMCNC: 31.3 G/DL (ref 31–36)
MCV RBC AUTO: 101 FL (ref 80–100)
MONOCYTES # BLD: 0.5 K/UL (ref 0–1.3)
MONOCYTES NFR BLD: 6 %
NEUTROPHILS # BLD: 7.1 K/UL (ref 1.7–7.7)
NEUTROPHILS NFR BLD: 79 %
PLATELET # BLD AUTO: 95 K/UL (ref 135–450)
PLATELET BLD QL SMEAR: ABNORMAL
PMV BLD AUTO: 7.5 FL (ref 5–10.5)
POLYCHROMASIA BLD QL SMEAR: ABNORMAL
POTASSIUM SERPL-SCNC: 3.7 MMOL/L (ref 3.5–5.1)
PROT FLD-MCNC: 0.5 G/DL
PROT SERPL-MCNC: 5.4 G/DL (ref 6.4–8.2)
PROT SERPL-MCNC: 5.7 G/DL (ref 6.4–8.2)
RBC # BLD AUTO: 2.97 M/UL (ref 4–5.2)
SLIDE REVIEW: ABNORMAL
SODIUM SERPL-SCNC: 141 MMOL/L (ref 136–145)
SPECIMEN SOURCE FLD: NORMAL
TRANSFERRIN SERPL-MCNC: 135 MG/DL (ref 200–360)
WBC # BLD AUTO: 9 K/UL (ref 4–11)

## 2025-03-21 PROCEDURE — 87449 NOS EACH ORGANISM AG IA: CPT

## 2025-03-21 PROCEDURE — 86147 CARDIOLIPIN ANTIBODY EA IG: CPT

## 2025-03-21 PROCEDURE — 6360000004 HC RX CONTRAST MEDICATION: Performed by: INTERNAL MEDICINE

## 2025-03-21 PROCEDURE — 1200000000 HC SEMI PRIVATE

## 2025-03-21 PROCEDURE — 87517 HEPATITIS B DNA QUANT: CPT

## 2025-03-21 PROCEDURE — 86334 IMMUNOFIX E-PHORESIS SERUM: CPT

## 2025-03-21 PROCEDURE — 84466 ASSAY OF TRANSFERRIN: CPT

## 2025-03-21 PROCEDURE — 86707 HEPATITIS BE ANTIBODY: CPT

## 2025-03-21 PROCEDURE — 80053 COMPREHEN METABOLIC PANEL: CPT

## 2025-03-21 PROCEDURE — 6370000000 HC RX 637 (ALT 250 FOR IP): Performed by: INTERNAL MEDICINE

## 2025-03-21 PROCEDURE — 74183 MRI ABD W/O CNTR FLWD CNTR: CPT

## 2025-03-21 PROCEDURE — 36415 COLL VENOUS BLD VENIPUNCTURE: CPT

## 2025-03-21 PROCEDURE — 87324 CLOSTRIDIUM AG IA: CPT

## 2025-03-21 PROCEDURE — 86704 HEP B CORE ANTIBODY TOTAL: CPT

## 2025-03-21 PROCEDURE — 85014 HEMATOCRIT: CPT

## 2025-03-21 PROCEDURE — 84165 PROTEIN E-PHORESIS SERUM: CPT

## 2025-03-21 PROCEDURE — 82270 OCCULT BLOOD FECES: CPT

## 2025-03-21 PROCEDURE — 85025 COMPLETE CBC W/AUTO DIFF WBC: CPT

## 2025-03-21 PROCEDURE — 51798 US URINE CAPACITY MEASURE: CPT

## 2025-03-21 PROCEDURE — 87493 C DIFF AMPLIFIED PROBE: CPT

## 2025-03-21 PROCEDURE — 85018 HEMOGLOBIN: CPT

## 2025-03-21 PROCEDURE — 87506 IADNA-DNA/RNA PROBE TQ 6-11: CPT

## 2025-03-21 PROCEDURE — 6360000002 HC RX W HCPCS: Performed by: INTERNAL MEDICINE

## 2025-03-21 PROCEDURE — 2580000003 HC RX 258: Performed by: INTERNAL MEDICINE

## 2025-03-21 PROCEDURE — 86146 BETA-2 GLYCOPROTEIN ANTIBODY: CPT

## 2025-03-21 PROCEDURE — 97535 SELF CARE MNGMENT TRAINING: CPT

## 2025-03-21 PROCEDURE — 83615 LACTATE (LD) (LDH) ENZYME: CPT

## 2025-03-21 PROCEDURE — 85520 HEPARIN ASSAY: CPT

## 2025-03-21 PROCEDURE — 97530 THERAPEUTIC ACTIVITIES: CPT

## 2025-03-21 PROCEDURE — 97166 OT EVAL MOD COMPLEX 45 MIN: CPT

## 2025-03-21 PROCEDURE — APPSS15 APP SPLIT SHARED TIME 0-15 MINUTES: Performed by: NURSE PRACTITIONER

## 2025-03-21 PROCEDURE — 86356 MONONUCLEAR CELL ANTIGEN: CPT

## 2025-03-21 PROCEDURE — 83010 ASSAY OF HAPTOGLOBIN QUANT: CPT

## 2025-03-21 PROCEDURE — 84155 ASSAY OF PROTEIN SERUM: CPT

## 2025-03-21 PROCEDURE — APPNB30 APP NON BILLABLE TIME 0-30 MINS: Performed by: NURSE PRACTITIONER

## 2025-03-21 PROCEDURE — 99232 SBSQ HOSP IP/OBS MODERATE 35: CPT | Performed by: SURGERY

## 2025-03-21 PROCEDURE — 87350 HEPATITIS BE AG IA: CPT

## 2025-03-21 PROCEDURE — A9577 INJ MULTIHANCE: HCPCS | Performed by: INTERNAL MEDICINE

## 2025-03-21 PROCEDURE — 97162 PT EVAL MOD COMPLEX 30 MIN: CPT

## 2025-03-21 RX ORDER — CARBAMAZEPINE 200 MG/1
200 TABLET ORAL 2 TIMES DAILY
Status: DISCONTINUED | OUTPATIENT
Start: 2025-03-21 | End: 2025-03-22 | Stop reason: HOSPADM

## 2025-03-21 RX ORDER — ONDANSETRON 2 MG/ML
4 INJECTION INTRAMUSCULAR; INTRAVENOUS EVERY 6 HOURS PRN
Status: DISCONTINUED | OUTPATIENT
Start: 2025-03-21 | End: 2025-03-22 | Stop reason: HOSPADM

## 2025-03-21 RX ORDER — MORPHINE SULFATE 100 MG/5ML
10 SOLUTION ORAL
Qty: 30 ML | Refills: 0 | Status: SHIPPED | OUTPATIENT
Start: 2025-03-21 | End: 2025-04-04

## 2025-03-21 RX ORDER — AMLODIPINE BESYLATE 5 MG/1
5 TABLET ORAL DAILY
Status: DISCONTINUED | OUTPATIENT
Start: 2025-03-21 | End: 2025-03-21

## 2025-03-21 RX ORDER — PROMETHAZINE HYDROCHLORIDE 25 MG/1
25 TABLET ORAL 4 TIMES DAILY PRN
Qty: 20 TABLET | Refills: 0 | Status: SHIPPED | OUTPATIENT
Start: 2025-03-21 | End: 2025-03-28

## 2025-03-21 RX ORDER — DESIPRAMINE HYDROCHLORIDE 25 MG/1
75 TABLET ORAL DAILY
Status: DISCONTINUED | OUTPATIENT
Start: 2025-03-21 | End: 2025-03-21

## 2025-03-21 RX ORDER — LORAZEPAM 2 MG/ML
1 CONCENTRATE ORAL
Qty: 30 ML | Refills: 0 | Status: SHIPPED | OUTPATIENT
Start: 2025-03-21 | End: 2025-04-04

## 2025-03-21 RX ORDER — ARIPIPRAZOLE 5 MG/1
5 TABLET ORAL DAILY
Status: DISCONTINUED | OUTPATIENT
Start: 2025-03-21 | End: 2025-03-21

## 2025-03-21 RX ORDER — MORPHINE SULFATE 2 MG/ML
2 INJECTION, SOLUTION INTRAMUSCULAR; INTRAVENOUS EVERY 4 HOURS PRN
Refills: 0 | Status: DISCONTINUED | OUTPATIENT
Start: 2025-03-21 | End: 2025-03-22 | Stop reason: HOSPADM

## 2025-03-21 RX ADMIN — AMLODIPINE BESYLATE 5 MG: 5 TABLET ORAL at 13:30

## 2025-03-21 RX ADMIN — MORPHINE SULFATE 2 MG: 2 INJECTION, SOLUTION INTRAMUSCULAR; INTRAVENOUS at 14:13

## 2025-03-21 RX ADMIN — CARBAMAZEPINE 200 MG: 200 TABLET ORAL at 13:29

## 2025-03-21 RX ADMIN — ONDANSETRON 4 MG: 2 INJECTION, SOLUTION INTRAMUSCULAR; INTRAVENOUS at 14:13

## 2025-03-21 RX ADMIN — DESIPRAMINE HYDROCHLORIDE 75 MG: 25 TABLET ORAL at 13:29

## 2025-03-21 RX ADMIN — SODIUM CHLORIDE: 0.9 INJECTION, SOLUTION INTRAVENOUS at 12:46

## 2025-03-21 RX ADMIN — HEPARIN SODIUM 14 UNITS/KG/HR: 10000 INJECTION, SOLUTION INTRAVENOUS at 11:04

## 2025-03-21 RX ADMIN — ARIPIPRAZOLE 5 MG: 5 TABLET ORAL at 13:30

## 2025-03-21 RX ADMIN — GADOBENATE DIMEGLUMINE 10 ML: 529 INJECTION, SOLUTION INTRAVENOUS at 12:31

## 2025-03-21 ASSESSMENT — PAIN SCALES - WONG BAKER
WONGBAKER_NUMERICALRESPONSE: NO HURT
WONGBAKER_NUMERICALRESPONSE: NO HURT

## 2025-03-21 ASSESSMENT — PAIN SCALES - GENERAL
PAINLEVEL_OUTOF10: 0
PAINLEVEL_OUTOF10: 0

## 2025-03-21 NOTE — DISCHARGE INSTR - COC
Continuity of Care Form    Patient Name: Seble Guerrero   :  1955  MRN:  [id number]    Admit date:  3/20/2025  Discharge date:  ***    Code Status Order: DNR-CC   Advance Directives:     Admitting Physician:  Brandy Burns MD  PCP: Inna Andrews MD    Discharging Nurse: LETICIA Mccarthy RN   Discharging Hospital Unit/Room#: 3AN-3317/3317-01  Discharging Unit Phone Number: [phone]    Emergency Contact:   Extended Emergency Contact Information  Primary Emergency Contact: Britney Zaidi  Address: [address]           [place] [id number] [employer]  Home Phone: [phone]  Mobile Phone: [phone]  Relation: Brother/Sister  Secondary Emergency Contact: Deonna Guerrero  Home Phone: [phone]  Relation: Niece/Nephew    Past Surgical History:  Past Surgical History:   Procedure Laterality Date    COLONOSCOPY N/A 2022    COLONOSCOPY WITH BIOPSY performed by Chapo Cochran MD at [facility] ENDOSCOPY    HYSTERECTOMY (CERVIX STATUS UNKNOWN)         Immunization History:   Immunization History   Administered Date(s) Administered    COVID-19, PFIZER PURPLE top, DILUTE for use, (age 12 y+), 30mcg/0.3mL 2021    TDaP, ADACEL (age 10y-64y), BOOSTRIX (age 10y+), IM, 0.5mL 2017       Active Problems:  Patient Active Problem List   Diagnosis Code    High cholesterol E78.00    Seizure (HCC) R56.9    Dysthymic disorder F34.1    Hypertension I10    Insomnia G47.00    Closed displaced fracture of right femoral neck (HCC) S72.001A    MR (mental retardation) F79    Hypokalemia E87.6    Closed right hip fracture, sequela S72.001S    Coagulation defect D68.9    Chronic renal disease, stage III ([facility]) [388702] N18.30    Chronic obstructive pulmonary disease, unspecified J44.9    Mesenteric vein thrombosis K55.069    Elevated LFTs R79.89    Portal vein thrombosis I81       Isolation/Infection:   Isolation            C Diff Contact  Contact          Patient Infection Status    None to  support    Rehab Therapies: n/a  Weight Bearing Status/Restrictions: No weight bearing restrictions  Other Medical Equipment (for information only, NOT a DME order):  hospital bed  Other Treatments: n/a    Patient's personal belongings (please select all that are sent with patient):  None    RN SIGNATURE:  Electronically signed by Tara Mccarthy RN on 3/22/25 at 12:00 PM EDT    CASE MANAGEMENT/SOCIAL WORK SECTION    Inpatient Status Date: ***    Readmission Risk Assessment Score:  [facility] RISK OF UNPLANNED READMISSION 2.0             12.6 Total Score        Discharging to Facility/ Agency   Name: Patient discharging to the [facility] at [facility]  [facility]  [address] [place] [address] [address]  [place] [address]  Phone: [phone] / [phone]  Fax: [phone]      Dialysis Facility (if applicable)   Name:  Address:  Dialysis Schedule:  Phone:  Fax:    / signature: Electronically signed by ANUEL ESCALONA RN/BSN/CCM  on 3/22/25 at 12:43 PM EDT    PHYSICIAN SECTION    Prognosis: {Prognosis:9211525013}    Condition at Discharge: { Patient Condition:345523436}    Rehab Potential (if transferring to Rehab): {Prognosis:3695422150}    Recommended Labs or Other Treatments After Discharge: ***    Physician Certification: I certify the above information and transfer of Seble Guerrero  is necessary for the continuing treatment of the diagnosis listed and that she requires {Admit to Appropriate Level of Care:47733} for {GREATER/LESS:098095728} 30 days.     Update Admission H&P: {CHP DME Changes in HandP:439483196}    PHYSICIAN SIGNATURE:  {Esignature:646991173}

## 2025-03-21 NOTE — PLAN OF CARE
Shift assessment completed and charted. VSS, tachycardic on telemetry. A/o x3, follows commands. Standard safety measures in place. SpO2 > 90% on room air. Patient denies pain at this time, reports some nausea this morning with breakfast. Patient abdomen soft but distended/bloated. Patient have active bowel sounds, is passing flatus. Heparin drip infusing per MAR, next anti-Xa scheduled for 10 AM. MRI pending, screening has been completed and faxed to MRI.Patient stable and denied needs when writer left room.    12:00 PM  Patient en route to MRI at this time. Stool studies have been sent - occult, GI PCR, and C diff. Patient denies pain but reports some nausea that comes and goes intermittently. Nausea is relieved by having a BM. Patient has had several small soft/ loose BM's.    Problem: Discharge Planning  Goal: Discharge to home or other facility with appropriate resources  3/21/2025 0903 by Damari Anand RN  Outcome: Progressing  3/20/2025 2326 by Jonel Moe RN  Outcome: Progressing     Problem: Skin/Tissue Integrity  Goal: Skin integrity remains intact  Description: 1.  Monitor for areas of redness and/or skin breakdown  2.  Assess vascular access sites hourly  3.  Every 4-6 hours minimum:  Change oxygen saturation probe site  4.  Every 4-6 hours:  If on nasal continuous positive airway pressure, respiratory therapy assess nares and determine need for appliance change or resting period  3/21/2025 0903 by Damari Anand RN  Outcome: Progressing  3/20/2025 2326 by Jonel Moe RN  Outcome: Progressing     Problem: Safety - Adult  Goal: Free from fall injury  3/21/2025 0903 by Damari Anand RN  Outcome: Progressing  3/20/2025 2326 by Jonel Moe RN  Outcome: Progressing     Problem: ABCDS Injury Assessment  Goal: Absence of physical injury  3/21/2025 0903 by Damari Anand RN  Outcome: Progressing  3/20/2025 2326 by Jonel Moe RN  Outcome: Progressing     Problem: Confusion  Goal: Confusion,  delirium, dementia, or psychosis is improved or at baseline  Description: INTERVENTIONS:  1. Assess for possible contributors to thought disturbance, including medications, impaired vision or hearing, underlying metabolic abnormalities, dehydration, psychiatric diagnoses, and notify attending LIP  2. Concepcion high risk fall precautions, as indicated  3. Provide frequent short contacts to provide reality reorientation, refocusing and direction  4. Decrease environmental stimuli, including noise as appropriate  5. Monitor and intervene to maintain adequate nutrition, hydration, elimination, sleep and activity  6. If unable to ensure safety without constant attention obtain sitter and review sitter guidelines with assigned personnel  7. Initiate Psychosocial CNS and Spiritual Care consult, as indicated  3/21/2025 0903 by Damari Anand, RN  Outcome: Progressing  3/20/2025 6007 by Jonel Moe, RN  Outcome: Progressing

## 2025-03-21 NOTE — CARE COORDINATION
Discharge Planning Note:     (VIRY) was notified that patient and family were interested in home with hospice services.     VIRY spoke with Esha @ [facility] [phone] re referral. VIRY faxed referral. Esha states that a hospice RN will follow up with family.   Electronically signed by KAE Jones on 3/21/25 at 3:44 PM EDT

## 2025-03-21 NOTE — PROGRESS NOTES
evolving      Subjective  General: Pt supine in bed upon arrival. Sister present in room. Pt agreeable to participate in PT/OT evaluation   Pain: 0/10  Pain Interventions: not applicable       Functional Mobility  Bed Mobility:  Supine to Sit: maximum assistance  Scootin person assistance with dependent   Comments: Pt unable to follow verbal cues nor physically able to scoot posterior onto EOB after t/f back to bed from commode. Pt requires 2 person scoot using chucks pad and B knee block.  Transfers:  Sit to stand transfer: 2 person assistance with Sarah+modA   Stand to sit transfer: 2 person assistance with Sarah+modA   Stand step transfer: 2 person assistance with Sarah+modA   Toilet transfer: stedy utilized requiring modAx2 , 2 person assistance with maxA   Comments: Pt completes initial STS from EOB to standing with HHA requiring Sarah+modA. Pt completes stand step transfer with RW to bed side commode requiring maxAx2. Pt transfers back to EOB from commode using modA+maxA. Pt then begins to transfer to recliner using stedy requiring modAx2 requiring vc's for hand placement. When pt attempts to stand she retracts her hands from the bar and needs manual cue for appropriate positioning. Due to pt having rectal tenesmus pt needs to transfer back to commode to deficate and then ends session back into recliner chair. Pt's BP is 94/66 after bed>commode t/f however elevates to 121/78 at end of session while reclined in chair.  Ambulation:  Ambulation not tested on this date secondary to general weakness.  Comments:    Stair Mobility:  Stair mobility not completed on this date.  Comments:  Wheelchair Mobility:  No w/c mobility completed on this date.  Comments:  Balance:  Static Sitting Balance: poor: requires mod (A) to maintain balance  Dynamic Sitting Balance: poor: requires mod (A) to maintain balance  Static Standing Balance: poor: requires mod (A) to maintain balance  Dynamic Standing Balance: poor: requires mod  date due to generalized weakness. Pt's PLOF is ambulating with HHA and requires assistance for ADL's. Pt requires maxA for bed mobility, modA+Sarah for transfers with RW, and modAx2 for stedy manipulation. Pt becomes slightly impulsive and has decreased cognition so requires ample verbal cues for initiating and hand placement prior to movement. Pt will benefit from continued skilled PT to work on functional mobility and safe return to PLOF.  Safety Interventions: bed alarm in place, patient left in chair, chair alarm in place, call light within reach, gait belt, patient at risk for falls, nurse notified, and family/caregiver present    Plan  Frequency: 3-5 x/per week  Current Treatment Recommendations: strengthening, ROM, balance training, functional mobility training, transfer training, gait training, stair training, endurance training, patient/caregiver education, ADL/self-care training, cognitive/perceptual training, and home exercise program    Goals  Patient Goals: None stated  Short Term Goals:  Time Frame: Until d/c  Patient will complete bed mobility at minimal assistance   Patient will complete transfers at minimal assistance   Patient will ambulate 15 ft with use of rolling walker at minimal assistance  Patient to maintain standing at contact guard assistance for 5 minutes.    Above goals reviewed on 3/21/2025.  All goals are ongoing at this time unless indicated above.      Therapy Session Time      Individual Group Co-treatment   Time In     0837   Time Out     0956   Minutes     79     Timed Code Treatment Minutes:  64 Minutes  Total Treatment Minutes:  79       Electronically Signed By: Madalyn Atkinson PT, DPT [id number]

## 2025-03-21 NOTE — PROGRESS NOTES
[facility]        Progress Note    Patient Name: Seble Guerrero  MRN: [id number]  YOB: 1955  Date of Evaluation: 3/21/2025    Chief Complaint: Abdominal pain    Subjective:  No acute events overnight  Denies pain  No nausea or vomiting, tolerating clear liquid diet  Passing flatus and stool, small blood, mild bloating  Resting in bed at this time      Vital Signs:  Patient Vitals for the past 24 hrs:   BP Temp Temp src Pulse Resp SpO2 Weight   25 0830 108/71 97.7 °F (36.5 °C) Oral (!) 103 16 97 % --   25 0600 -- -- -- 100 -- -- --   25 0445 118/69 98.1 °F (36.7 °C) Oral (!) 103 16 98 % --   25 0207 -- -- -- 97 -- -- --   25 0130 -- -- -- -- -- -- 63.3 kg (139 lb 8.8 oz)   25 2154 137/80 97.6 °F (36.4 °C) Axillary 100 16 99 % --   25 2000 -- -- -- 63 -- -- --   25 1400 -- -- -- -- -- -- 64.3 kg (141 lb 12.1 oz)      TEMPERATURE HISTORY 24H: Temp (24hrs), Av.8 °F (36.6 °C), Min:97.6 °F (36.4 °C), Max:98.1 °F (36.7 °C)    BLOOD PRESSURE HISTORY: Systolic (36hrs), Av , Min:108 , Max:137    Diastolic (36hrs), Av, Min:69, Max:80      Intake/Output:  I/O last 3 completed shifts:  In: -   Out: 500 [Urine:500]  I/O this shift:  In: 120 [P.O.:120]  Out: 500 [Urine:500]  Drain/tube Output:       Physical Exam:  General: awake, alert, oriented to no acute distress  Cardiovascular:  regular rate and rhythm and no murmur noted  Lungs: clear to auscultation  Abdomen: soft, mildly distended, no significant tenderness noted, bowel sounds present     Labs:  CBC:    Recent Labs     25  1132 25  2208 25  0356 25  0819   WBC 10.5  --  9.0  --    HGB 10.8* 10.1* 9.4* 9.8*   HCT 34.0* 31.2* 30.0* 30.8*   *  --  95*  --      BMP:    Recent Labs     25  1132 25  0356    141   K 4.5 3.7    111*   CO2 20* 21   BUN 23* 19   CREATININE 1.1 1.0   GLUCOSE 106* 91     Hepatic:  Yenni Deal

## 2025-03-21 NOTE — DISCHARGE INSTRUCTIONS
Learning About Hepatitis B  What is hepatitis B?    Hepatitis B is a virus that infects the liver. Most people who get it have it for a short time and then get better. This is called acute hepatitis B.  Sometimes the virus causes a long-term infection, called chronic hepatitis B. Over time, it can damage your liver. Babies and young children infected with the virus are more likely to get chronic hepatitis B.  You can have hepatitis B and not know it. You may not have symptoms. If you do, they can make you feel like you have the flu. As long as you have the virus, you can spread it to others.  What happens when you have hepatitis B?  Most adults who get hepatitis B have it for a short time and then get better. Sometimes the disease lasts a long time. Having this disease for a long time can damage your liver and increase the risk of liver cancer. After you've had hepatitis B and recovered, you won't get it again.  What are the symptoms?  Most people who get hepatitis B do not have symptoms. If you have symptoms, you may just feel like you have the flu. Symptoms usually start to go away in 2 to 3 weeks and may include:  Fatigue.  A fever.  Headaches.  Nausea.  Vomiting.  Light-colored stools.  Dark urine.  Yellow skin and eyes (jaundice).  How can you prevent hepatitis B?  The hepatitis B vaccine is the best way to prevent infection. The vaccine is a series of 2, 3, or 4 shots. Adults ages 19 to 59 and all babies, children, and teenagers should be vaccinated. Adults ages 60 and older who have not had this vaccine series may need this shot when their job, travel, health condition, or lifestyle increases their risk of exposure.  A combination vaccine that protects against both hepatitis B and hepatitis A also is available.  To avoid getting or spreading the virus to others:  Use a condom when you have sex.  Don't share needles.  Wear disposable gloves if you have to touch blood.  Don't share toothbrushes or

## 2025-03-21 NOTE — CARE COORDINATION
Patient has wound to inner buttocks present on admission.  Wound care order set  placed. Per nurse, patient being discharged to hospice, consult no longer needed. CLARIBEL ZHAON, RN, CWOCN  Inpatient  Wound/Ostomy Care  [phone]

## 2025-03-21 NOTE — PROGRESS NOTES
Pharmacy to check patient copay for  Eliquis and Xarelto    Copay for patient will be: $0/month for either    Pharmacy will continue to follow the decision on therapy and  the patient if appropriate.       Christi Mccain PharmD, [facility]  [id number]  3/21/2025 9:42 AM

## 2025-03-21 NOTE — PLAN OF CARE
Problem: Discharge Planning  Goal: Discharge to home or other facility with appropriate resources  Outcome: Progressing     Problem: Skin/Tissue Integrity  Goal: Skin integrity remains intact  Description: 1.  Monitor for areas of redness and/or skin breakdown  2.  Assess vascular access sites hourly  3.  Every 4-6 hours minimum:  Change oxygen saturation probe site  4.  Every 4-6 hours:  If on nasal continuous positive airway pressure, respiratory therapy assess nares and determine need for appliance change or resting period  Outcome: Progressing     Problem: Safety - Adult  Goal: Free from fall injury  Outcome: Progressing     Problem: ABCDS Injury Assessment  Goal: Absence of physical injury  Outcome: Progressing     Problem: Confusion  Goal: Confusion, delirium, dementia, or psychosis is improved or at baseline  Description: INTERVENTIONS:  1. Assess for possible contributors to thought disturbance, including medications, impaired vision or hearing, underlying metabolic abnormalities, dehydration, psychiatric diagnoses, and notify attending LIP  2. Olla high risk fall precautions, as indicated  3. Provide frequent short contacts to provide reality reorientation, refocusing and direction  4. Decrease environmental stimuli, including noise as appropriate  5. Monitor and intervene to maintain adequate nutrition, hydration, elimination, sleep and activity  6. If unable to ensure safety without constant attention obtain sitter and review sitter guidelines with assigned personnel  7. Initiate Psychosocial CNS and Spiritual Care consult, as indicated  Outcome: Progressing      Recommend eye exam to check for glasses in the near future.

## 2025-03-21 NOTE — CONSULTS
[facility]    Consult Note      Requesting Physician:      CHIEF COMPLAINT:  Abdominal pain      HISTORY OF PRESENT ILLNESS:    Ms. Guerrero  is a 69 y.o. 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  CT abdomen and pelvis done at the outside facility ([facility] [place]) had shown portal vein thrombosis as well as liver lesions. It also showed cirrhosis  Patient was then transferred to [facility].  IV heparin has been started.    Patient has been evaluated gastroenterologist.  MRI liver has been ordered    Patient and her sister are not able to give any meaningful history.    Patient says that she is reasonably active  No anorexia or weight loss  No external bleeding  No prior history of thrombosis.      Past Surgical History:  Past Surgical History:   Procedure Laterality Date    COLONOSCOPY N/A 5/25/2022    COLONOSCOPY WITH BIOPSY performed by Chapo Cochran MD at [facility] ENDOSCOPY    HYSTERECTOMY (CERVIX STATUS UNKNOWN)         Current Medications:  Current Facility-Administered Medications   Medication Dose Route Frequency Provider Last Rate Last Admin    0.9 % sodium chloride infusion   IntraVENous Continuous Brandy Burns  mL/hr at 03/20/25 1147 New Bag at 03/20/25 1147    sodium chloride flush 0.9 % injection 5-40 mL  5-40 mL IntraVENous 2 times per day Brandy Burns MD   10 mL at 03/20/25 1149    sodium chloride flush 0.9 % injection 5-40

## 2025-03-21 NOTE — PROGRESS NOTES
Hospitalist Progress Note      PCP: Inna Andrews MD    Date of Admission: 3/20/2025    Chief Complaint: Direct admit for mesenteric vein thrombosis from [facility] ER.    Hospital Course: 68 yo F with mental debility, HTN, seizure disorder was directly admitted from [facility] ER with mesenteric vein thrombosis and cirrhosis.  Started on Hep gtt.  Followed by Vascular, GI and Oncology.    For MRI Abdomen with contrast.    PT/OT recommend SNF.    Sister discussed with Oncology.  Offered biopsy and treatment options for liver mass.    We recommend hospice and comfort care for this patient.    Sister wants to take patient home with hospice.  Changed to DNR-CC and comfort meds sent to pharmacy.  Will discharge home with hospice on 3/22/25 when \[facility] has made arranged.    Subjective:  Patient denies CP, SOB, HA or abdominal pain.  No fevers.  She is unable to stand by herself.  Sister is at bedside.       Medications:  Reviewed    Infusion Medications    sodium chloride 100 mL/hr at 03/21/25 1246    sodium chloride      heparin (PORCINE) Infusion 14 Units/kg/hr (03/21/25 1247)     Scheduled Medications    amLODIPine  5 mg Oral Daily    ARIPiprazole  5 mg Oral Daily    carBAMazepine  200 mg Oral BID    desipramine  75 mg Oral Daily    sodium chloride flush  5-40 mL IntraVENous 2 times per day     PRN Meds: morphine, ondansetron, sodium chloride flush, sodium chloride, polyethylene glycol, acetaminophen **OR** acetaminophen      Intake/Output Summary (Last 24 hours) at 3/21/2025 1631  Last data filed at 3/21/2025 1316  Gross per 24 hour   Intake 240 ml   Output 500 ml   Net -260 ml       Physical Exam Performed:    /79   Pulse (!) 104   Temp 97.7 °F (36.5 °C) (Oral)   Resp 16   Ht 1.524 m (5')   Wt 63.3 kg (139 lb 8.8 oz)   SpO2 97%   BMI 27.25 kg/m²     General appearance: Chronically ill appearing, NAD  HEENT: Pupils equal, round, and reactive to light. Conjunctivae/corneas  made.    Lavon Willis MD

## 2025-03-21 NOTE — PROGRESS NOTES
CLINICAL PHARMACY NOTE: MEDS TO BEDS    Total # of Prescriptions Filled: 3   The following medications were delivered to the patient:  Morphine  Ativan intensol  promethazine    Additional Documentation:  Jessica SANDHU has medications, sister Britney signed and paid for meds. Britney will get from Jessica at [place]

## 2025-03-21 NOTE — PROGRESS NOTES
[facility] - Inpatient Rehabilitation Department   Phone: [phone]    Occupational Therapy    [x] Initial Evaluation            [] Daily Treatment Note         [] Discharge Summary      Patient: Seble Guerrero   : 1955   MRN: [id number]   Date of Service:  3/21/2025    Admitting Diagnosis:  Mesenteric vein thrombosis  Current Admission Summary:   68 yo F with mental debility, HTN, seizure disorder was directly admitted from [facility] with mesenteric vein thrombosis and cirrhosis.  Started on Hep gtt.  Followed by Vascular, GI and Oncology.    For MRI Abdomen with contrast   Past Medical History:  has a past medical history of Anxiety, Depression, Hypertension, Mental deficiency, Mild mental handicap, and Seizures (HCC).  Past Surgical History:  has a past surgical history that includes Hysterectomy and Colonoscopy (N/A, 2022).    Discharge Recommendations: Seble Guerrero scored a 10/24 on the AM-PAC ADL Inpatient form. Current research shows that an AM-PAC score of 17 or less is typically not associated with a discharge to the patient's home setting. Based on the patient's AM-PAC score and their current ADL deficits, it is recommended that the patient have 3-5 sessions per week of Occupational Therapy at d/c to increase the patient's independence.  Please see assessment section for further patient specific details.    If patient discharges prior to next session this note will serve as a discharge summary.  Please see below for the latest assessment towards goals.      DME Required For Discharge: DME to be determined pending patient progress    Precautions/Restrictions: high fall risk, contact precautions, clear liquid diet   Weight Bearing Restrictions: no restrictions  Required Braces/Orthotics: no braces required  Positional Restrictions:no positional restrictions    Pre-Admission Information - Pt is poor historian and pt's sister provides majority of information  Lives With:   endurance, decreased balance, decreased IADL  Prognosis: fair  Clinical Assessment: Pt presenting below baseline function secondary to abdominal pain and mesenteric vein thrombosis. Pt typically able to complete transfers, short distance of mobility with HHA, and ADLs with assist of 1 from sister. Pt requiring assist of 2 for functional transfer and total A for ADLs. Pt limited by decreased strength. Pt will benefit from continued OT services to address above deficits and maximize safety and independence prior to returning home.   Safety Interventions: patient left in chair, chair alarm in place, call light within reach, patient at risk for falls, nurse notified, and family/caregiver present    Plan  Frequency: 3-5 x/per week  Current Treatment Recommendations: strengthening, balance training, functional mobility training, transfer training, endurance training, patient/caregiver education, ADL/self-care training, and positioning    Goals  Patient Goals: not stated    Short Term Goals:  Time Frame: discharge   Patient will complete upper body ADL at minimal assistance   Patient will complete lower body ADL at moderate assistance   Patient will complete toileting at moderate assistance   Patient will complete functional transfers at moderate assistance   Patient will increase [facility] ADL score = to or > than 16/24    Above goals reviewed on 3/21/2025.  All goals are ongoing at this time unless indicated above.       Therapy Session Time     Individual Group Co-treatment   Time In    0837   Time Out    0956   Minutes    79        Timed Code Treatment Minutes:  Timed Code Treatment Minutes: 64 Minutes  Total Treatment Minutes:  79 minutes        Electronically Signed By: DANYEL Norris MOT OTR/L [id number]

## 2025-03-21 NOTE — PROGRESS NOTES
Gastroenterology Progress Note      Seble Guerrero is a 69 y.o. female patient.  No diagnosis found.    SUBJECTIVE:  Had 3 liquid BMs this am. No abdominal pain. Tolerating clear liquids.         Physical    VITALS:  /71   Pulse (!) 103   Temp 97.7 °F (36.5 °C) (Oral)   Resp 16   Ht 1.524 m (5')   Wt 63.3 kg (139 lb 8.8 oz)   SpO2 97%   BMI 27.25 kg/m²   TEMPERATURE:  Current - Temp: 97.7 °F (36.5 °C); Max - Temp  Av.8 °F (36.6 °C)  Min: 97.6 °F (36.4 °C)  Max: 98.1 °F (36.7 °C)    NAD  RRR   Lungs CTA Bilaterally, normal effort  Abdomen soft, ND, NT, no HSM, Bowel sounds normal  AAOx3, No asterixis     Data    Data Review:    Recent Labs     25  1132 25  2208 25  0356 25  0819   WBC 10.5  --  9.0  --    HGB 10.8* 10.1* 9.4* 9.8*   HCT 34.0* 31.2* 30.0* 30.8*   .1*  --  101.0*  --    *  --  95*  --      Recent Labs     25  1132 25  0356    141   K 4.5 3.7    111*   CO2 20* 21   BUN 23* 19   CREATININE 1.1 1.0     Recent Labs     25  1132 25  0356   * 110*   ALT 57* 54*   BILITOT 2.3* 1.9*   ALKPHOS 499* 466*     No results for input(s): \"LIPASE\", \"AMYLASE\" in the last 72 hours.  Recent Labs     25  113   PROTIME 17.0*   INR 1.36*     Invalid input(s): \"PTT\"      MRI liver w and wo contrast 3/21/25  IMPRESSION:  1. Technically limited MRI due to patient condition.  2. Dominant enhancing lesion in the right hepatic lobe with imaging features  that would favor hepatocellular carcinoma (LR-4).  3. The more diffuse nodularity of the liver is very difficult to evaluate  given technical limitations of this study. Differential considerations would  strongly favor regenerative nodules. Multifocal hepatocellular carcinoma  would be considered less likely (LR-2).  4. Cirrhotic morphology of the liver.  5. Ascites and mesenteric edema.  6. The portal vein is not well visualized. This may be due to  comfort care and hospice care are noted and I completely agree.  The patient may be discharged from the GI standpoint given the above.    GI will follow from a distance.  Please call with questions.        Demar Zhong MD  [facility]  3/21/2025

## 2025-03-21 NOTE — PROGRESS NOTES
ONCOLOGY HEMATOLOGY CARE PROGRESS NOTE      SUBJECTIVE:    Events noted  The patient is somewhat drowsy.  No external bleeding per sister    ROS:         OBJECTIVE        Physical    VITALS:  Patient Vitals for the past 24 hrs:   BP Temp Temp src Pulse Resp SpO2 Weight   03/21/25 1413 -- -- -- -- 18 -- --   03/21/25 1330 136/79 -- -- (!) 104 -- -- --   03/21/25 0830 108/71 97.7 °F (36.5 °C) Oral (!) 103 16 97 % --   03/21/25 0600 -- -- -- 100 -- -- --   03/21/25 0445 118/69 98.1 °F (36.7 °C) Oral (!) 103 16 98 % --   03/21/25 0207 -- -- -- 97 -- -- --   03/21/25 0130 -- -- -- -- -- -- 63.3 kg (139 lb 8.8 oz)   03/20/25 2154 137/80 97.6 °F (36.4 °C) Axillary 100 16 99 % --   03/20/25 2000 -- -- -- 63 -- -- --       24HR INTAKE/OUTPUT:    Intake/Output Summary (Last 24 hours) at 3/21/2025 1448  Last data filed at 3/21/2025 1316  Gross per 24 hour   Intake 240 ml   Output 1000 ml   Net -760 ml     Drowsy but arousable.  Not in distress.  Periumbilical tenderness noted  Extremities no edema      DATA:  CBC:    Recent Labs     03/21/25  0819 03/21/25  0356 03/20/25  2208 03/20/25  1132   WBC  --  9.0  --  10.5   NEUTROABS  --  7.1  --  7.9*   LYMPHOPCT  --  14.0  --  16.0   RBC  --  2.97*  --  3.40*   HGB 9.8* 9.4* 10.1* 10.8*   HCT 30.8* 30.0* 31.2* 34.0*   MCV  --  101.0*  --  100.1*   MCH  --  31.6  --  31.8   MCHC  --  31.3  --  31.8   RDW  --  16.4*  --  16.1*   PLT  --  95*  --  115*       PT/INR:    Recent Labs     03/20/25  1132   INR 1.36*     PTT:    Recent Labs     03/20/25  1535   APTT 21.8*       CMP:    Recent Labs     03/21/25  0356 03/20/25  1132    140   K 3.7 4.5   * 109   CO2 21 20*   GLUCOSE 91 106*   BUN 19 23*   CREATININE 1.0 1.1   LABGLOM 62 55*   CALCIUM 7.5* 7.7*   AGRATIO 0.8* 0.8*   BILITOT 1.9* 2.3*   ALKPHOS 466* 499*   ALT 54* 57*   * 122*       Lab Results   Component Value Date    CALCIUM 7.5 (L) 03/21/2025       LDH:  Recent Labs  told her that most likely we are dealing with hepatocellular carcinoma.  Considering her overall condition, poor performance status I doubt if any treatment would make meaningful impact.  We discussed about getting biopsy and then making decision about further treatment versus not doing biopsy and considering best supportive care/hospice care.  Deonna is not keen on getting biopsy on her and.  Hospice care would be appropriate.  She will be meeting up with hospice tomorrow.    Bhanu Muñoz MD

## 2025-03-21 NOTE — ACP (ADVANCE CARE PLANNING)
Advanced Care Planning Note.    Purpose of Encounter: Advanced care planning in liver mass  Parties In Attendance: Patient, sister  Decisional Capacity: Limited  Subjective: Patient is weak  Objective: Cr 1.0  Goals of Care Determination: Patient/POA want limited support (No CPR, no vent, no surgery, no HD, no trach, no PEG)  Plan:  Hep gtt, MRI Abdomen, GI/Vascular/Onc consults  Code Status:  DNR-CC   Time spent on Advanced care Plannin minutes  Advanced Care Planning Documents: Completed advanced directives on chart, sister is the POA.    Lavon Willis MD  3/21/2025 9:49 AM

## 2025-03-21 NOTE — CARE COORDINATION
03/21/25 1622   IMM Letter   IMM Letter given to Patient/Family/Significant other/Guardian/POA/by: patient signed   IMM Letter date given: 03/21/25   IMM Letter time given: 1605     Electronically signed by KAE Jones on 3/21/25 at 4:22 PM EDT

## 2025-03-22 VITALS
TEMPERATURE: 98.6 F | WEIGHT: 143.3 LBS | DIASTOLIC BLOOD PRESSURE: 67 MMHG | RESPIRATION RATE: 18 BRPM | SYSTOLIC BLOOD PRESSURE: 112 MMHG | HEIGHT: 60 IN | BODY MASS INDEX: 28.13 KG/M2 | HEART RATE: 111 BPM | OXYGEN SATURATION: 96 %

## 2025-03-22 LAB
AFP-TM SERPL-MCNC: ABNORMAL UG/L
C DIFF TOX GENS STL QL NAA+PROBE: ABNORMAL
CARDIOLIPIN IGG SER IA-ACNC: <10 GPL
CARDIOLIPIN IGM SER IA-ACNC: <10 MPL
HAV AB SER QL IA: POSITIVE
ORGANISM: ABNORMAL
SMA IGG SER-ACNC: 16 UNITS (ref 0–19)

## 2025-03-22 PROCEDURE — 99232 SBSQ HOSP IP/OBS MODERATE 35: CPT | Performed by: SURGERY

## 2025-03-22 PROCEDURE — 6370000000 HC RX 637 (ALT 250 FOR IP): Performed by: INTERNAL MEDICINE

## 2025-03-22 PROCEDURE — 6370000000 HC RX 637 (ALT 250 FOR IP)

## 2025-03-22 PROCEDURE — 51798 US URINE CAPACITY MEASURE: CPT

## 2025-03-22 PROCEDURE — 6360000002 HC RX W HCPCS: Performed by: INTERNAL MEDICINE

## 2025-03-22 RX ORDER — ONDANSETRON 4 MG/1
TABLET, ORALLY DISINTEGRATING ORAL
Status: COMPLETED
Start: 2025-03-22 | End: 2025-03-22

## 2025-03-22 RX ORDER — ONDANSETRON 4 MG/1
4 TABLET, ORALLY DISINTEGRATING ORAL EVERY 8 HOURS PRN
Status: DISCONTINUED | OUTPATIENT
Start: 2025-03-22 | End: 2025-03-22 | Stop reason: HOSPADM

## 2025-03-22 RX ADMIN — ONDANSETRON 4 MG: 4 TABLET, ORALLY DISINTEGRATING ORAL at 07:39

## 2025-03-22 RX ADMIN — MORPHINE SULFATE 2 MG: 2 INJECTION, SOLUTION INTRAMUSCULAR; INTRAVENOUS at 11:10

## 2025-03-22 RX ADMIN — ONDANSETRON 4 MG: 2 INJECTION, SOLUTION INTRAMUSCULAR; INTRAVENOUS at 11:10

## 2025-03-22 RX ADMIN — ACETAMINOPHEN 650 MG: 325 TABLET ORAL at 07:39

## 2025-03-22 RX ADMIN — CARBAMAZEPINE 200 MG: 200 TABLET ORAL at 07:40

## 2025-03-22 ASSESSMENT — PAIN SCALES - GENERAL
PAINLEVEL_OUTOF10: 7
PAINLEVEL_OUTOF10: 4
PAINLEVEL_OUTOF10: 3

## 2025-03-22 ASSESSMENT — PAIN DESCRIPTION - LOCATION
LOCATION: ABDOMEN
LOCATION: BACK

## 2025-03-22 NOTE — DISCHARGE SUMMARY
shift imaging.  No definitive enhancement. Vascular Abnormalities: Hepatic artery is normal.  The portal vein is not well visualized.  This may be due to technical limitations or timing.  An underlying pattern of portal vein thrombosis cannot be excluded given hepatic findings. Bile Ducts: Normal Gallbladder: Normal Other: Ascites and mesenteric edema is noted.  No splenomegaly.  Asymmetric atrophy of the left kidney.     1. Technically limited MRI due to patient condition. 2. Dominant enhancing lesion in the right hepatic lobe with imaging features that would favor hepatocellular carcinoma (LR-4). 3. The more diffuse nodularity of the liver is very difficult to evaluate given technical limitations of this study. Differential considerations would strongly favor regenerative nodules. Multifocal hepatocellular carcinoma would be considered less likely (LR-2). 4. Cirrhotic morphology of the liver. 5. Ascites and mesenteric edema. 6. The portal vein is not well visualized. This may be due to technical limitations or timing. An underlying pattern of portal vein thrombosis cannot be excluded given hepatic findings.     IR US GUIDED PARACENTESIS  Result Date: 3/20/2025  PROCEDURE: PARACENTESIS WITH IMAGE GUIDANCE US ABDOMEN LIMITED 3/20/2025 HISTORY: ORDERING SYSTEM PROVIDED HISTORY: new dx cirrhosis, liver lesions. Diagnostic and therapeutic tap.  Labs for cell count, culture, protein, albumin, cytology TECHNOLOGIST PROVIDED HISTORY: Reason for exam:->new dx cirrhosis, liver lesions.  Diagnostic and therapeutic tap.  Labs for cell count, culture, protein, albumin, cytology PHYSICIANS: Chago Sánchez M.D. TECHNIQUE: Informed consent was obtained after a detailed explanation of the procedure including risks, benefits, and alternatives. Universal protocol was followed. A limited ultrasound of the abdomen was performed. The right abdomen was prepped and draped in sterile fashion and local anesthesia was achieved with  lidocaine. A 5-F Ziarco centesis catheter was advanced into ascites under real time ultrasound guidance and paracentesis was performed. The patient tolerated the procedure well. Estimated blood loss: Minimal (less than 5 mL) FINDINGS: Limited ultrasound of the abdomen demonstrates ascites. A total of 0.1 L of clear yellow ascites was removed.     Successful ultrasound guided paracentesis.       CBC:   Recent Labs     03/20/25  1132 03/20/25  2208 03/21/25  0356 03/21/25  0819 03/21/25  1526   WBC 10.5  --  9.0  --   --    HGB 10.8*   < > 9.4* 9.8* 10.5*   *  --  95*  --   --     < > = values in this interval not displayed.     BMP:    Recent Labs     03/20/25  1132 03/21/25  0356    141   K 4.5 3.7    111*   CO2 20* 21   BUN 23* 19   CREATININE 1.1 1.0   GLUCOSE 106* 91     Hepatic:   Recent Labs     03/20/25  1132 03/21/25  0356   * 110*   ALT 57* 54*   BILITOT 2.3* 1.9*   ALKPHOS 499* 466*     Lipids:   Lab Results   Component Value Date/Time    CHOL 156 10/15/2024 04:02 PM    HDL 54 10/15/2024 04:02 PM    TRIG 101 10/15/2024 04:02 PM     Hemoglobin A1C:   Lab Results   Component Value Date/Time    LABA1C 5.0 08/03/2018 07:09 AM     TSH:   Lab Results   Component Value Date/Time    TSH 1.09 05/18/2022 12:08 PM     Troponin: No results found for: \"TROPONINT\"  Lactic Acid:   Recent Labs     03/20/25  1304   LACTA 2.2*     BNP: No results for input(s): \"PROBNP\" in the last 72 hours.  UA:  Lab Results   Component Value Date/Time    NITRU Negative 07/31/2018 07:54 PM    COLORU YELLOW 07/31/2018 07:54 PM    PHUR 7.5 07/31/2018 07:54 PM    WBCUA 3 07/31/2018 08:14 PM    RBCUA 1 07/31/2018 08:14 PM    BACTERIA Rare 03/01/2015 04:35 PM    CLARITYU Clear 07/31/2018 07:54 PM    LEUKOCYTESUR SMALL 07/31/2018 07:54 PM    UROBILINOGEN 1.0 07/31/2018 07:54 PM    BILIRUBINUR Negative 07/31/2018 07:54 PM    BLOODU Negative 07/31/2018 07:54 PM    GLUCOSEU Negative 07/31/2018 07:54 PM    KETUA Negative

## 2025-03-22 NOTE — PLAN OF CARE
Problem: Safety - Adult  Goal: Free from fall injury  Outcome: Progressing     Problem: Skin/Tissue Integrity  Goal: Skin integrity remains intact  Description: 1.  Monitor for areas of redness and/or skin breakdown  2.  Assess vascular access sites hourly  3.  Every 4-6 hours minimum:  Change oxygen saturation probe site  4.  Every 4-6 hours:  If on nasal continuous positive airway pressure, respiratory therapy assess nares and determine need for appliance change or resting period  Outcome: Not Progressing     Problem: Skin/Tissue Integrity  Goal: Skin integrity remains intact  Description: 1.  Monitor for areas of redness and/or skin breakdown  2.  Assess vascular access sites hourly  3.  Every 4-6 hours minimum:  Change oxygen saturation probe site  4.  Every 4-6 hours:  If on nasal continuous positive airway pressure, respiratory therapy assess nares and determine need for appliance change or resting period  Outcome: Not Progressing

## 2025-03-22 NOTE — CARE COORDINATION
Case Management -  Discharge Note      Patient Name: Seble Guerrero                   YOB: 1955  Room: [id number]/5575-            Readmission Risk (Low < 19, Mod (19-27), High > 27): Readmission Risk Score: 12.1    Current PCP: Inna Andrews MD      (IMM) Important Message from Medicare:    Has pt received appropriate compliance notices before being discharged if required: yes  Compliance doc:  [x] 2nd IMM; [] Code 44 [] Martínez  Date Given: 3/21/25 Given By: RACHEAL    PT AM-PAC: 8 /24  OT AM-PAC: 10 /24    Patient discharging [place] [facility] [place] [facility]  [facility]  [id number] [place] [address] [address]  [place] [address]  Phone: [phone] / [phone]  Fax: [phone]    Transportation scheduled for 1245  Transportation provided by [facility]  ([phone])     Family Notified:  Yes    [facility] agency notified of discharge:  [] Yes [] No  [x] NA    Family notified of discharge:  [x] Yes  [] No  [] NA    Facility notified of discharge:  [x] Yes  [] No  [] NA    Pt is being discharged with Outpt IV Antibiotics  [] Yes [x] No  [] NA  If yes, make sure BRANDEN is faxed to [facility] agency, and meds are called in to pharmacy by RN from BRANDEN orders only.      Financial    Payor: HU[employer] MEDICARE / Plan: HU[employer] GOLD PLUS HMO / Product Type: *No Product type* /     Pharmacy:  Potential assistance Purchasing Medications:    Meds-to-Beds request:        [facility] [id number] - [place] - [address] - P [phone] - F [phone]  [address] [place] [address]  Phone: [phone] Fax: [phone]      Notes:    Additional Case Management Notes:   [facility] EDSON Carpenter informed the hospice inpatient unit of patient's discharge.       Electronically signed by ANUEL ESCALONA RN/BSN/CCM  on 3/22/2025

## 2025-03-22 NOTE — PROGRESS NOTES
Patient and her family member repeatedly calling the nurse stating she needs to pee. Patient placed on and off bed pan several times without success. Bladder scan is 63 ml at this time.

## 2025-03-22 NOTE — PROGRESS NOTES
[facility]        Progress Note    Patient Name: Seble Guerrero  MRN: [id number]  YOB: 1955  Date of Evaluation: 3/22/2025    Chief Complaint: Abdominal pain    Subjective:  No acute events overnight  Pain minimal  No nausea  Passing stool  No complaints, awaiting discharge to hospice    Vital Signs:  Patient Vitals for the past 24 hrs:   BP Temp Temp src Pulse Resp SpO2 Weight   25 0739 112/67 98.6 °F (37 °C) Oral (!) 111 18 96 % --   25 0645 -- -- -- -- -- -- 65 kg (143 lb 4.8 oz)   25 2200 121/73 98.3 °F (36.8 °C) Oral (!) 105 16 -- --   25 1443 -- -- -- -- 16 -- --   25 1413 -- -- -- -- 18 -- --   25 1330 136/79 -- -- (!) 104 -- -- --      TEMPERATURE HISTORY 24H: Temp (24hrs), Av.5 °F (36.9 °C), Min:98.3 °F (36.8 °C), Max:98.6 °F (37 °C)    BLOOD PRESSURE HISTORY: Systolic (36hrs), Av , Min:108 , Max:136    Diastolic (36hrs), Av, Min:67, Max:79      Intake/Output:  I/O last 3 completed shifts:  In: 240 [P.O.:240]  Out: 880 [Urine:880]  No intake/output data recorded.  Drain/tube Output:       Physical Exam:  General: awake, alert, no acute distress  Cardiovascular:  regular rate and rhythm and no murmur noted  Lungs: clear to auscultation  Abdomen: soft, mildly distended, non-tender    Labs:  CBC:    Recent Labs     25  1132 25  2208 25  0356 25  0819 25  1526   WBC 10.5  --  9.0  --   --    HGB 10.8*   < > 9.4* 9.8* 10.5*   HCT 34.0*   < > 30.0* 30.8* 34.0*   *  --  95*  --   --     < > = values in this interval not displayed.     BMP:    Recent Labs     25  1132 25  0356    141   K 4.5 3.7    111*   CO2 20* 21   BUN 23* 19   CREATININE 1.1 1.0   GLUCOSE 106* 91     Hepatic:    Recent Labs     25  1132 25  0356   * 110*   ALT 57* 54*   BILITOT 2.3* 1.9*   ALKPHOS 499* 466*     Amylase:  No results found for: \"AMYLASE\"  Lipase:  control  Antiemetics as needed, diet as tolerated  Defer management of remainder of medical comorbidities to primary and consulting teams    Mark Burkett MD, FACS  3/22/2025  1:26 PM

## 2025-03-22 NOTE — PROGRESS NOTES
Physical Therapy  Spoke with pt and family member - pt is going to inpatient hospice at [facility] at 12:45 today. Declining further therapy services. D/c from PT at this time.    Brynn Acosta, PT DPT [id number]

## 2025-03-22 NOTE — PROGRESS NOTES
Occupational Therapy    Seble Guerrero     Attempted OT/PT co-treatment. Following discussion with patient's family member and pt, pt is set up to discharge to inpatient hospice at 12:45. Offers for OOB and therapeutic activity offered. Pt reports she is comfortable and feeling better. She wishes not to participate in therapy this date.      Sravanthi Gao, M/OT, OTR/L- [id number]

## 2025-03-22 NOTE — CARE COORDINATION
Discharge Planning;  RACHEAL spoke  with Nurse Carpenter with [facility] who stated  has just met with patient and family. Patient will be admitting to their  Inpatient unit at the [facility] [facility].  Transport has been scheduled with pickup time of 1245 via   [facility]  ([phone]) .

## 2025-03-23 LAB
ANTINUCLEAR AB INTERPRETIVE COMMENT: NORMAL
B2 GLYCOPROT1 IGG SERPL IA-ACNC: <10 SGU
B2 GLYCOPROT1 IGM SERPL IA-ACNC: <10 SMU
BACTERIA FLD AEROBE CULT: NORMAL
CARDIOLIPIN IGA SER IA-ACNC: <10 APL
GI PATHOGENS PNL STL NAA+PROBE: NORMAL
GRAM STN SPEC: NORMAL
HBV CORE AB SERPL QL IA: POSITIVE
HBV E AB SERPL QL IA: POSITIVE
HBV E AG SERPL QL IA: POSITIVE
NUCLEAR IGG SER QL IF: NORMAL

## 2025-03-24 ENCOUNTER — TELEPHONE (OUTPATIENT)
Dept: SURGERY | Age: 70
End: 2025-03-24

## 2025-03-24 LAB
A1AT PHENOTYP SERPL-IMP: NORMAL
A1AT SERPL-MCNC: 136 MG/DL (ref 90–200)
ALBUMIN SERPL ELPH-MCNC: 2.2 G/DL (ref 3.1–4.9)
ALPHA1 GLOB SERPL ELPH-MCNC: 0.2 G/DL (ref 0.2–0.4)
ALPHA2 GLOB SERPL ELPH-MCNC: 0.5 G/DL (ref 0.4–1.1)
B-GLOBULIN SERPL ELPH-MCNC: 0.8 G/DL (ref 0.9–1.6)
BACTERIA BLD CULT ORG #2: NORMAL
BACTERIA BLD CULT: NORMAL
GAMMA GLOB SERPL ELPH-MCNC: 1.7 G/DL (ref 0.6–1.8)
HBV SURFACE AG SERPL QL NT: POSITIVE
MITOCHONDRIA M2 AB SER IA-ACNC: 2.6 U/ML (ref 0–4)
PROT SERPL-MCNC: 5.4 G/DL (ref 6.4–8.2)
REASON FOR REJECTION: NORMAL
REJECTED TEST: NORMAL
SPE/IFE INTERPRETATION: NORMAL

## 2025-03-24 NOTE — TELEPHONE ENCOUNTER
Call from Debby at [facility]  microbiology lab [phone], patient has a positive c-diff test.

## 2025-03-25 LAB — SPECIMEN SOURCE: NORMAL

## 2025-03-26 LAB
REASON FOR REJECTION: NORMAL
REJECTED TEST: NORMAL

## 2025-05-15 LAB
F2 C.20210G>A GENO BLD/T: NEGATIVE
F5 P.R506Q BLD/T QL: NEGATIVE
SPECIMEN SOURCE: NORMAL
SPECIMEN SOURCE: NORMAL

## (undated) DEVICE — FORCEPS BX 240CM 2.4MM L NDL RAD JAW 4 M00513334